# Patient Record
Sex: FEMALE | Race: WHITE | NOT HISPANIC OR LATINO | URBAN - METROPOLITAN AREA
[De-identification: names, ages, dates, MRNs, and addresses within clinical notes are randomized per-mention and may not be internally consistent; named-entity substitution may affect disease eponyms.]

---

## 2017-09-02 ENCOUNTER — EMERGENCY (EMERGENCY)
Facility: HOSPITAL | Age: 82
LOS: 0 days | Discharge: HOME | End: 2017-09-02
Attending: EMERGENCY MEDICINE | Admitting: EMERGENCY MEDICINE
Payer: MEDICARE

## 2017-09-02 VITALS
DIASTOLIC BLOOD PRESSURE: 87 MMHG | TEMPERATURE: 98 F | HEART RATE: 80 BPM | OXYGEN SATURATION: 100 % | SYSTOLIC BLOOD PRESSURE: 133 MMHG | RESPIRATION RATE: 16 BRPM

## 2017-09-02 VITALS — WEIGHT: 164.91 LBS | HEIGHT: 64 IN
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
## 2017-09-02 DIAGNOSIS — S42.293A OTHER DISPLACED FRACTURE OF UPPER END OF UNSPECIFIED HUMERUS, INITIAL ENCOUNTER FOR CLOSED FRACTURE: ICD-10-CM
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
## 2017-09-02 DIAGNOSIS — S49.91XA UNSPECIFIED INJURY OF RIGHT SHOULDER AND UPPER ARM, INITIAL ENCOUNTER: ICD-10-CM

## 2017-09-02 PROCEDURE — 99283 EMERGENCY DEPT VISIT LOW MDM: CPT

## 2017-09-02 PROCEDURE — 73030 X-RAY EXAM OF SHOULDER: CPT | Mod: 26,RT

## 2017-09-02 PROCEDURE — 73060 X-RAY EXAM OF HUMERUS: CPT | Mod: 26,RT

## 2017-09-02 RX ORDER — IBUPROFEN 200 MG
1 TABLET ORAL
Qty: 20 | Refills: 0
Start: 2017-09-02 | End: 2017-09-07

## 2017-09-02 NOTE — ED STATDOCS - OBJECTIVE STATEMENT
81 y/o female presents to the ED c/o fall. Pt was walking in a normal pace into the store where she fell, and has right shoulder/arm pain,  happened half hour ago. Pt landed on her arm and shoulder.  PSX right shoulder surgery "years ago". Denies numbness/tingling, no pain the neck.

## 2017-09-02 NOTE — ED STATDOCS - PROGRESS NOTE DETAILS
SUZIE Pierre:   Patient has been seen, evaluated and orders have been written by the attending in intake. Patient is stable.  I will follow up the results of orders written and I will continue to evaluate/observe the patient.  Paula Pierre PA-C Dr. Douglas reviewed films and patient may follow up in office Tuesday.  Pt. defers pain management.  Paula Pierre PA-C sling applied.  Paula Pierre PA-C

## 2017-09-02 NOTE — ED STATDOCS - MEDICAL DECISION MAKING DETAILS
Humeral head fx.  Neurovascularly intact.  Sling applied, and evaluated by Dr. zhao here.  Okay for d/c home, pain meds, and f/u with orthopedics.  Return precautions given.

## 2018-02-21 ENCOUNTER — EMERGENCY (EMERGENCY)
Facility: HOSPITAL | Age: 83
LOS: 0 days | Discharge: HOME | End: 2018-02-21
Attending: EMERGENCY MEDICINE | Admitting: EMERGENCY MEDICINE
Payer: COMMERCIAL

## 2018-02-21 VITALS
SYSTOLIC BLOOD PRESSURE: 120 MMHG | HEART RATE: 70 BPM | OXYGEN SATURATION: 100 % | TEMPERATURE: 98 F | DIASTOLIC BLOOD PRESSURE: 87 MMHG | RESPIRATION RATE: 20 BRPM

## 2018-02-21 VITALS — WEIGHT: 141.1 LBS

## 2018-02-21 DIAGNOSIS — Z04.1 ENCOUNTER FOR EXAMINATION AND OBSERVATION FOLLOWING TRANSPORT ACCIDENT: ICD-10-CM

## 2018-02-21 PROCEDURE — 99283 EMERGENCY DEPT VISIT LOW MDM: CPT
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
## 2018-05-19 ENCOUNTER — APPOINTMENT (OUTPATIENT)
Dept: CT IMAGING | Facility: CLINIC | Age: 83
End: 2018-05-19

## 2018-05-19 ENCOUNTER — OUTPATIENT (OUTPATIENT)
Dept: OUTPATIENT SERVICES | Facility: HOSPITAL | Age: 83
LOS: 1 days | End: 2018-05-19
Payer: MEDICARE

## 2018-05-19 DIAGNOSIS — Z00.8 ENCOUNTER FOR OTHER GENERAL EXAMINATION: ICD-10-CM

## 2018-05-19 PROCEDURE — 74263 CT COLONOGRAPHY SCREENING: CPT

## 2018-05-19 PROCEDURE — 74263 CT COLONOGRAPHY SCREENING: CPT | Mod: 26,GY

## 2019-11-12 ENCOUNTER — EMERGENCY (EMERGENCY)
Facility: HOSPITAL | Age: 84
LOS: 0 days | Discharge: HOME | End: 2019-11-12
Attending: STUDENT IN AN ORGANIZED HEALTH CARE EDUCATION/TRAINING PROGRAM
Payer: MEDICARE

## 2019-11-12 VITALS
RESPIRATION RATE: 16 BRPM | TEMPERATURE: 98 F | DIASTOLIC BLOOD PRESSURE: 87 MMHG | HEART RATE: 98 BPM | SYSTOLIC BLOOD PRESSURE: 104 MMHG | WEIGHT: 156.09 LBS | OXYGEN SATURATION: 100 % | HEIGHT: 64 IN

## 2019-11-12 VITALS
OXYGEN SATURATION: 97 % | HEART RATE: 94 BPM | TEMPERATURE: 97 F | DIASTOLIC BLOOD PRESSURE: 77 MMHG | RESPIRATION RATE: 16 BRPM | SYSTOLIC BLOOD PRESSURE: 108 MMHG

## 2019-11-12 DIAGNOSIS — S09.90XA UNSPECIFIED INJURY OF HEAD, INITIAL ENCOUNTER: ICD-10-CM
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
## 2019-11-12 DIAGNOSIS — M50.30 OTHER CERVICAL DISC DEGENERATION, UNSPECIFIED CERVICAL REGION: ICD-10-CM

## 2019-11-12 PROCEDURE — 72125 CT NECK SPINE W/O DYE: CPT | Mod: 26

## 2019-11-12 PROCEDURE — 76376 3D RENDER W/INTRP POSTPROCES: CPT

## 2019-11-12 PROCEDURE — 70486 CT MAXILLOFACIAL W/O DYE: CPT | Mod: 26

## 2019-11-12 PROCEDURE — 72125 CT NECK SPINE W/O DYE: CPT

## 2019-11-12 PROCEDURE — 70450 CT HEAD/BRAIN W/O DYE: CPT

## 2019-11-12 PROCEDURE — 70486 CT MAXILLOFACIAL W/O DYE: CPT

## 2019-11-12 PROCEDURE — 99284 EMERGENCY DEPT VISIT MOD MDM: CPT | Mod: 25

## 2019-11-12 PROCEDURE — 76376 3D RENDER W/INTRP POSTPROCES: CPT | Mod: 26

## 2019-11-12 PROCEDURE — 99284 EMERGENCY DEPT VISIT MOD MDM: CPT

## 2019-11-12 PROCEDURE — 70450 CT HEAD/BRAIN W/O DYE: CPT | Mod: 26

## 2019-11-12 NOTE — ED PROVIDER NOTE - OBJECTIVE STATEMENT
85 y/o female with no significant PMHx presents to the ED s/p slip and fall on leaves. Pt notes she was walking back from the grocery store when she slipped and fell. No LOC. +multiple abrasions. Not on anticoagulants. Tdap UTD. Pt was able to ambulate after. No other complaints at this time.

## 2019-11-12 NOTE — ED PROVIDER NOTE - PROGRESS NOTE DETAILS
Katy DO: Patient ambulatory in ED with steady gait; given copy of all ct scans; notified of need for outpatient dental f/u- patient with no dental complaints at this time; strict return precautions given.

## 2019-11-12 NOTE — ED PROVIDER NOTE - PATIENT PORTAL LINK FT
You can access the FollowMyHealth Patient Portal offered by [facility] by registering at the following website: [email or web address]. By joining [employer]’s FollowMyHealth portal, you will also be able to view your health information using other applications (apps) compatible with our system.

## 2019-11-12 NOTE — ED ADULT NURSE NOTE - NSIMPLEMENTINTERV_GEN_ALL_ED
Implemented All Universal Safety Interventions:  Wana to call system. Call bell, personal items and telephone within reach. Instruct patient to call for assistance. Room bathroom lighting operational. Non-slip footwear when patient is off stretcher. Physically safe environment: no spills, clutter or unnecessary equipment. Stretcher in lowest position, wheels locked, appropriate side rails in place.

## 2019-11-12 NOTE — ED ADULT TRIAGE NOTE - CHIEF COMPLAINT QUOTE
Pt BIBEMS for eval of slip/fall on leaves with abrasions to forehead, nose, lower lip, sm lace to chin, bilateral knee abrasions. Pt is AAO x4 denies AC and denies LOC.

## 2019-11-12 NOTE — ED PROVIDER NOTE - SKIN, MLM
Skin normal color for race, warm, dry. No evidence of rash. Abrasion to nasal bridge above lip. Abrasion to right knee.

## 2019-11-19 ENCOUNTER — APPOINTMENT (OUTPATIENT)
Dept: UROGYNECOLOGY | Facility: CLINIC | Age: 84
End: 2019-11-19

## 2019-12-25 ENCOUNTER — TRANSCRIPTION ENCOUNTER (OUTPATIENT)
Age: 84
End: 2019-12-25

## 2020-04-06 NOTE — ED ADULT NURSE NOTE - CAS EDP DISCH TYPE
Anemia    CAD (coronary artery disease)    DM (diabetes mellitus)    Hyperlipidemia    Hypertension
Home

## 2020-05-28 ENCOUNTER — EMERGENCY (EMERGENCY)
Facility: HOSPITAL | Age: 85
LOS: 0 days | Discharge: HOME | End: 2020-05-28
Attending: EMERGENCY MEDICINE
Payer: MEDICARE

## 2020-05-28 VITALS
HEART RATE: 66 BPM | RESPIRATION RATE: 16 BRPM | SYSTOLIC BLOOD PRESSURE: 134 MMHG | OXYGEN SATURATION: 100 % | DIASTOLIC BLOOD PRESSURE: 76 MMHG | TEMPERATURE: 98 F

## 2020-05-28 DIAGNOSIS — R05 COUGH: ICD-10-CM

## 2020-05-28 PROCEDURE — 99283 EMERGENCY DEPT VISIT LOW MDM: CPT

## 2020-05-28 PROCEDURE — 99282 EMERGENCY DEPT VISIT SF MDM: CPT

## 2020-05-28 PROCEDURE — U0003: CPT

## 2020-05-28 NOTE — ED STATDOCS - OBJECTIVE STATEMENT
84 yo pt presents to ED for cough.  Pt is requesting covid19 swab.  Pt with cough, no fever, no sob, no n/v/d.  No travel, no sick contact. No other complaints

## 2020-05-28 NOTE — ED ADULT NURSE NOTE - OBJECTIVE STATEMENT
Patient evaluated, treated, and discharged by MD. Refer to MD note for assessment. Discharge instructions given verbally and understood by patient. Self-quarantine and COVID-19 information provided to patient. Unable to sign secondary to COVID-19 PUI. Pt. gives verbal consent for results to be given via phone.

## 2020-05-28 NOTE — ED STATDOCS - PATIENT PORTAL LINK FT
You can access the FollowMyHealth Patient Portal offered by [facility] by registering at the following website: [email or web address]. By joining [employer]’s FollowMyHealth portal, you will also be able to view your health information using other applications (apps) compatible with our system.

## 2020-05-29 LAB — SARS-COV-2 RNA SPEC QL NAA+PROBE: SIGNIFICANT CHANGE UP

## 2020-07-25 ENCOUNTER — EMERGENCY (EMERGENCY)
Facility: HOSPITAL | Age: 85
LOS: 0 days | Discharge: HOME | End: 2020-07-25
Attending: EMERGENCY MEDICINE
Payer: MEDICARE

## 2020-07-25 VITALS
WEIGHT: 160.06 LBS | TEMPERATURE: 99 F | SYSTOLIC BLOOD PRESSURE: 145 MMHG | DIASTOLIC BLOOD PRESSURE: 77 MMHG | OXYGEN SATURATION: 97 % | RESPIRATION RATE: 16 BRPM | HEART RATE: 65 BPM | HEIGHT: 65 IN

## 2020-07-25 DIAGNOSIS — S00.83XA CONTUSION OF OTHER PART OF HEAD, INITIAL ENCOUNTER: ICD-10-CM
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
## 2020-07-25 DIAGNOSIS — Z23 ENCOUNTER FOR IMMUNIZATION: ICD-10-CM

## 2020-07-25 DIAGNOSIS — S09.90XA UNSPECIFIED INJURY OF HEAD, INITIAL ENCOUNTER: ICD-10-CM

## 2020-07-25 DIAGNOSIS — S42.024A NONDISPLACED FRACTURE OF SHAFT OF RIGHT CLAVICLE, INITIAL ENCOUNTER FOR CLOSED FRACTURE: ICD-10-CM

## 2020-07-25 DIAGNOSIS — M25.511 PAIN IN RIGHT SHOULDER: ICD-10-CM

## 2020-07-25 DIAGNOSIS — S80.212A ABRASION, LEFT KNEE, INITIAL ENCOUNTER: ICD-10-CM

## 2020-07-25 DIAGNOSIS — S80.211A ABRASION, RIGHT KNEE, INITIAL ENCOUNTER: ICD-10-CM
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
## 2020-07-25 PROCEDURE — 72170 X-RAY EXAM OF PELVIS: CPT | Mod: 26

## 2020-07-25 PROCEDURE — 73030 X-RAY EXAM OF SHOULDER: CPT | Mod: RT

## 2020-07-25 PROCEDURE — 72170 X-RAY EXAM OF PELVIS: CPT

## 2020-07-25 PROCEDURE — 73030 X-RAY EXAM OF SHOULDER: CPT | Mod: 26,RT

## 2020-07-25 PROCEDURE — 99284 EMERGENCY DEPT VISIT MOD MDM: CPT

## 2020-07-25 PROCEDURE — 73562 X-RAY EXAM OF KNEE 3: CPT | Mod: 50

## 2020-07-25 PROCEDURE — 70450 CT HEAD/BRAIN W/O DYE: CPT

## 2020-07-25 PROCEDURE — 73562 X-RAY EXAM OF KNEE 3: CPT | Mod: 26,50

## 2020-07-25 PROCEDURE — 99284 EMERGENCY DEPT VISIT MOD MDM: CPT | Mod: 25

## 2020-07-25 PROCEDURE — 70450 CT HEAD/BRAIN W/O DYE: CPT | Mod: 26

## 2020-07-25 PROCEDURE — 90715 TDAP VACCINE 7 YRS/> IM: CPT

## 2020-07-25 PROCEDURE — 90471 IMMUNIZATION ADMIN: CPT

## 2020-07-25 RX ORDER — BACITRACIN ZINC 500 UNIT/G
1 OINTMENT IN PACKET (EA) TOPICAL ONCE
Refills: 0 | Status: COMPLETED | OUTPATIENT
Start: 2020-07-25 | End: 2020-07-25

## 2020-07-25 RX ORDER — TETANUS TOXOID, REDUCED DIPHTHERIA TOXOID AND ACELLULAR PERTUSSIS VACCINE, ADSORBED 5; 2.5; 8; 8; 2.5 [IU]/.5ML; [IU]/.5ML; UG/.5ML; UG/.5ML; UG/.5ML
0.5 SUSPENSION INTRAMUSCULAR ONCE
Refills: 0 | Status: COMPLETED | OUTPATIENT
Start: 2020-07-25 | End: 2020-07-25

## 2020-07-25 RX ORDER — ACETAMINOPHEN 500 MG
650 TABLET ORAL ONCE
Refills: 0 | Status: COMPLETED | OUTPATIENT
Start: 2020-07-25 | End: 2020-07-25

## 2020-07-25 RX ADMIN — TETANUS TOXOID, REDUCED DIPHTHERIA TOXOID AND ACELLULAR PERTUSSIS VACCINE, ADSORBED 0.5 MILLILITER(S): 5; 2.5; 8; 8; 2.5 SUSPENSION INTRAMUSCULAR at 13:53

## 2020-07-25 RX ADMIN — Medication 1 APPLICATION(S): at 13:54

## 2020-07-25 RX ADMIN — Medication 650 MILLIGRAM(S): at 13:52

## 2020-07-25 NOTE — ED PROVIDER NOTE - CARE PROVIDER_API CALL
Jama Dale  ORTHOPAEDIC SURGERY  [address] [place] [address]  Phone: [phone]  Fax: [phone]  Follow Up Time:

## 2020-07-25 NOTE — ED ADULT NURSE NOTE - PHONE #
Resting in bed with eyes closed. No signs of distress, labored breathing or pain noted. Assessment complete. VS stable. All needs met at this time. Bed in lowest position with alarm and Avasys in place. Call light within reach. [phone]

## 2020-07-25 NOTE — ED PROVIDER NOTE - ENMT, MLM
+Swelling and ecchymosis to right eyebrow. Airway patent, Nasal mucosa clear. Mouth with normal mucosa.

## 2020-07-25 NOTE — ED PROVIDER NOTE - PROGRESS NOTE DETAILS
spoke to pt . will give pt a sling, recommend follow up with an orthopedist. pt wants tylenol for pain JACIEL Yusuf DO

## 2020-07-25 NOTE — ED PROVIDER NOTE - MUSCULOSKELETAL, MLM
Spine appears normal. Right shoulder is non-tender but has decreased ROM secondary to pain without deformities, abrasions, swelling nor ecchymosis. FROM of the legs.

## 2020-07-25 NOTE — ED ADULT NURSE NOTE - OBJECTIVE STATEMENT
c/o trip and fall, denies LOC, landed on bilateral knees, and on arms out stretched, and hit right side of head on pavement while walking to car, denies taking anticoagulants, c/o decrease movement to right arm due to shoulder pain, right temporal hematoma and abrasion, bilateral knee abrasions HX: high cholesterol

## 2020-07-25 NOTE — ED PROVIDER NOTE - NSFOLLOWUPINSTRUCTIONS_ED_ALL_ED_FT
ice to right shoulder  you have a clavical fracture close to your right shoulder  keep sling on to decrease the pain and immobilize  take sling off at night as it is a choking shiloh  you got a tetanus shot today  tylenol as needed for pain  bacitrain to knees twice a day and keep dressing clean and dry, you may shower or bathe

## 2020-07-25 NOTE — ED PROVIDER NOTE - CARE PLAN
Principal Discharge DX:	Facial contusion, initial encounter  Secondary Diagnosis:	Clavicle fracture, shaft  Secondary Diagnosis:	Knee abrasion

## 2020-07-25 NOTE — ED PROVIDER NOTE - OBJECTIVE STATEMENT
85 year old female with no PMHx presents to the ED BIB EMS s/p slip and fall PTA. Pt reports slip and fall while stepping onto a sidewalk, states she struck the right side of her head and right shoulder on the sidewalk. Denies LOC. Pt says she takes blood thinners. Presents with bruising to right eyebrow and abrasions to B/L knees complaining of pain to right shoulder. Pt is unsure if her tetanus is UTD. 85 year old female with no PMHx presents to the ED BIB EMS s/p slip and fall over sidewalk PTA. Pt struck the right side of her head and right shoulder on the sidewalk. Denies LOC. Pt says she takes blood thinners. Presents with bruising to right eyebrow and abrasions to B/L knees complaining of pain to right shoulder. Pt is unsure if her tetanus is UTD.

## 2020-07-25 NOTE — ED PROVIDER NOTE - PATIENT PORTAL LINK FT
You can access the FollowMyHealth Patient Portal offered by [facility] by registering at the following website: [email or web address]. By joining [employer]’s FollowMyHealth portal, you will also be able to view your health information using other applications (apps) compatible with our system.

## 2020-07-25 NOTE — ED PROVIDER NOTE - CONSTITUTIONAL, MLM
normal... Pleasantly confused, awake, alert, oriented to person, place, time/situation and in no apparent distress.
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
## 2020-09-22 ENCOUNTER — TRANSCRIPTION ENCOUNTER (OUTPATIENT)
Age: 85
End: 2020-09-22

## 2020-09-25 NOTE — ED ADULT NURSE NOTE - CAS TRG GEN SKIN CONDITION
Hospital Medicine  Progress note    Team: [facility]OS[facility] MED A Keenan Gonzales MD  Admit Date: 8/30/2020  EDY 9/28/2020  Length of Stay:  LOS: 26 days   Code status: Full Code    Principal Problem:  Septic shock due to methicillin resistant Staphylococcus aureus    HPI / Hospital Course     Interval hx:  Refused angiogram this yesterday. Vascular surgery signed off. Will need IV abx, SNF may be best option.    ROS     Respiratory: neg for cough neg for shortness of breath  Cardiovascular: neg for chest pain neg for palpitations  Gastrointestinal: neg for nausea neg for vomiting, neg for abdominal pain neg for diarrhea neg for constipation   Behavioral/Psych: neg for depression neg for anxiety    PEx  Temp:  [98.1 °F (36.7 °C)-99.4 °F (37.4 °C)]   Pulse:  []   Resp:  [16-22]   BP: (107-130)/(72-86)   SpO2:  [91 %-99 %]     Intake/Output Summary (Last 24 hours) at 9/25/2020 0851  Last data filed at 9/25/2020 0300  Gross per 24 hour   Intake 924 ml   Output 335 ml   Net 589 ml       General Appearance: no acute distress   Heart: regular rate and rhythm  Respiratory: Normal respiratory effort, no crackles   Abdomen: Soft, non-tender; bowel sounds active  Skin: intact.Skin intact  Neurologic:  No focal numbness or weakness  Mental status: Alert, oriented x 4, affect appropriate     Recent Labs   Lab 09/23/20  0300 09/24/20  0515 09/25/20  0459   WBC 14.68* 13.90* 14.18*   HGB 8.2* 8.0* 8.0*   HCT 27.5* 27.3* 26.8*    379* 322     Recent Labs   Lab 09/23/20  0300 09/24/20  0515 09/25/20  0459    136 136   K 4.3 3.7 3.6    101 99   CO2 21* 27 26   BUN 31* 27* 22   CREATININE 1.9* 1.7* 1.7*   * 121* 89   CALCIUM 8.2* 8.2* 8.1*   MG 1.4* 1.9 1.6   PHOS 2.5* 2.3* 2.7     Recent Labs   Lab 09/19/20  0605  09/21/20  0755  09/23/20  0300 09/24/20  0515 09/25/20  0459   ALKPHOS 140*  --  111  --   --  94  --    ALT 49*  --  43  --   --  40  --    AST 55*  --  52*  --   --  50*  --    ALBUMIN 1.7*   < >  1.7*  1.7*   < > 1.6* 1.6*  1.6* 1.6*   PROT 6.9  --  6.8  --   --  6.8  --    BILITOT 0.5  --  0.5  --   --  0.5  --     < > = values in this interval not displayed.        Recent Labs   Lab 09/24/20  2126 09/24/20  2140 09/24/20  2214 09/24/20  2244 09/25/20  0138 09/25/20  0744   POCTGLUCOSE 53* 51* 68* 93 76 95       Scheduled Meds:   artificial tears  1 drop Both Eyes TID    carvediloL  6.25 mg Oral BID    fluticasone furoate-vilanteroL  1 puff Inhalation Daily    fluticasone propionate  1 spray Each Nostril Daily    heparin (porcine)  5,000 Units Subcutaneous Q8H    levoFLOXacin  750 mg Oral Every other day    levothyroxine  75 mcg Oral Before breakfast    sodium bicarbonate  1,300 mg Oral BID    vancomycin (VANCOCIN) IVPB  15 mg/kg Intravenous Q24H     Continuous Infusions:  As Needed:  acetaminophen, albuterol-ipratropium, calcium carbonate, dextrose 50%, dextrose 50%, glucagon (human recombinant), glucose, glucose, insulin aspart U-100, melatonin, ondansetron, oxyCODONE, polyethylene glycol, DIPH,PERTUS (ADACEL),TETANUS PF VAC (ADULT), Pharmacy to dose Vancomycin consult **AND** vancomycin - pharmacy to dose    ** update problem list    Active Hospital Problems    Diagnosis  POA    *Septic shock due to methicillin resistant Staphylococcus aureus [A41.02, R65.21]  Yes    Cellulitis of left lower extremity [L03.116]  Unknown    PVD (peripheral vascular disease) [I73.9]  Unknown    MRSA bacteremia [R78.81]  Yes    Acute renal insufficiency [N28.9]  Yes    Acute metabolic encephalopathy [G93.41]  No    Hospital-acquired pneumonia [J18.9, Y95]  No    Acute on chronic respiratory failure with hypoxia [J96.21]  No    Debility [R53.81]  Yes    Acute renal failure with acute tubular necrosis superimposed on stage 3 chronic kidney disease [N17.0, N18.3]  No    Septic arthritis of knee, right [M00.9]  Yes    Staphylococcal arthritis of right knee [M00.061]  Yes    Diabetic ulcer of left foot  associated with type 2 diabetes mellitus, with fat layer exposed [E11.621, L97.522]  Yes    Diabetic foot infection [E11.628, L08.9]  Yes    COPD mixed type [J44.9]  Yes     Chronic    Postablative hypothyroidism [E89.0]  Yes     Chronic    Hyperlipidemia [E78.5]  Yes     High ASCVD with CAD, elevated A1c      Type 2 diabetes mellitus with stage 3 chronic kidney disease, with long-term current use of insulin [E11.22, N18.3, Z79.4]  Not Applicable     GFR> 60, uncontrolled DM      Chronic systolic congestive heart failure [I50.22]  Yes     Chronic     EF 35% in 2015 echo, improved in 5/2018. Patient with mixed ischemic and non-ischemic cardiomyopathy        Resolved Hospital Problems    Diagnosis Date Resolved POA    Endocarditis [I38] 09/12/2020 Yes    Sepsis due to methicillin resistant Staphylococcus aureus [A41.02] 09/13/2020 Yes    Type 2 diabetes mellitus with ketoacidosis without coma, with long-term current use of insulin [E11.10, Z79.4] 09/18/2020 Not Applicable     Novolin 70/30 40U in AM, 30U in PM. Elevated A1c         Assessment and Plan  / Problems managed today    Overview/Hospital Course:  Mr. Ed Patton was admitted to hospital medicine on 08/30 for management of a left-sided diabetic foot infection that was precipitated by an undetected punction wound after stepping on a tack/nail. His presentation was notable for leukocytosis with elevated inflammatory markers, however MRI of left foot only showed cellulitis of the anterior and lateral aspect of foot. Podiatry was consulted with recommendations for IV antibiotics; he was initiated on Ciprofloxacin and Vancomycin on admission. However, blood cultures from admission resulted positive for MRSA with wound cultures from left foot also growing MRSA with Proteus mirabilis. By 09/03 cultures remained positive despite Vancomycin, and patient was noted to develop right knee pain with swelling. Orthopedics was consulted and patient underwent  right knee joint aspiration positive for septic arthritis with cultures also positive for MRSA.      LUKAS on 09/04 showed known chronic systolic heart failure, but was negative for endocarditis. MRI of lumbar spine on 09/08 was negative for abscess. By 09/06, blood cultures continued to remain positive, and he underwent I&D of right arm abscess on 09/08 with cultures also positive for MRSA.      For persistent MRSA bacteremia, he was transitioned to Daptomycin and Ceftaroline, however this regimen was discontinued by 09/10 due to concern for developing rhabdomylosis. By 09/09, he was noted to develop a progressively worsening leukocytosis and NANETTE. Nephrology was consulted on 09/09 with suspicion for ATN.      Hematology was consulted on 09/12 for persistent leukocytosis as likely reactive from bacteremia. On 09/10, patient began developing intermitent hypotension prompting broadening of antibiotics to Cefepime and Vancomycin. By 09/12, renal function continued to worsen in addition to hypotension prompting transfer to ICU for vasopressor support and possible HD. Patient found to be encephalopathic, central line placed and started on CRRT overnight on 9/14 with improvement in mental status, but remained encephalopathic. CT head without any acute intracranial process. Vasopressin off AM of 9/15 but remains on levophed, CT abdomen with concern for potential cholecystitis and possible atelectasis with superimposed pneumonia.  Due to the acuity of his illness is he is not a surgical candidate it and ultimately is now status post cholecystostomy tube placement for management of suspected cholecystitis     Additional complications arising during hospital course include the development metabolic encephalopathy secondary to uremia versus shock versus DKA or overall cerebral hypoperfusion from shock, reported to have agitation prior to step-down from ICU.  Also developed DKA requiring standard therapy with IV insulin infusion,  however he was kept on IV insulin due to rising sugars any time it was weaned working on basal bolus regimen for him at this time with the assistance of a endocrinology           Septic shock_Septic arthritis of Right Knee_Sespsi due to MRSA  This is a 65 year old  male with PMH significant for HFrEF and COPD with chronic respiratory failure (on 2L home oxygen), T2DM with CKD III, and iron deficiency anemia who originally presented to [facility] on 08/30 with cellulitis of left foot with concern for diabetic foot infection with subsequent development of MRSA bacteremia attributed to septic arthritis of right knee and elbow. He is status post drainage and coverage for MRSA since that time. His work-up for other etiologies of MRSA bacteremia have included negative LUKAS and MRI of lumbar spine looking for endocarditis and spinal abscess, respectively. Stool studies for C.diff on 09/11 were negative. His hospital course has been associated with worsening hypotension and leukocytosis since 09/10 prompting ICU admission on 09/12 for initiation of vasopressor support. Infectious work-up thus far on ICU admission concern for likely right lower lobe HAP.      CT abdomen pelvis on 9/15 with gallbladder dilation, sludge, and trace pericholic fluid collection. Exam not consistent with cholecystitis but given persistent shock potential source. Also with potential right lower lobe atelectasis with overlying infection  S/p ICU stepdown  -CK levels were rising so spoke with infectious disease and will need to switch patient back to Vancomycin. - Consult order placed.      Type 2 DM with DKA  -endocrinology following pt kept on transitional drip but has since been weaned to a basal bolus regimen.         Postablative hypothyroidism  Plan: Continue home SYNTHROID     Acute kidney injury  -likely ATN from Shock s/p requiring RRT in ICU  -trialysis line removed   -pt is non-oliguric  -nephrology recommends sodium bicarb  tabs - order placed.   -Nephrology recommends ambulatory f/u on discharge     Chronic systolic congestive heart failure  -Most recent ECHO in 09/2020 with EF of 25%.   -Unclear is ischemic vs non-ischemic.   -Home regimen: Carvedilol, Lasix 80 mg, and Lisinopril.           >due to NANETTE lasix and Lisinopril on hold, but he is recovering renal function and prior to discharge to any facility will likely need some amount of diuretic re-initiated and potentially a much lower doseage of Ace-inhibitor.   -Associated with chronic hypoxemic respiratory failure requiring 2L nasal cannula, but noted to develop worsening oxygen requirements on ICU admission that were likely multifactorial from suspected hospital acquired pneumonia versus declining urine output with pre-disposition to volume overload. .      COPD mixed type  -Based on PFT's from 05/2015 showing mild (small airways) obstruction, airflow not improved after bronchodilator, and moderate restriction.     Acute on chronic respiratory failure with hypoxia  -History of mixed COPD (based on PFT's from 05/2015 showing mild (small airways) obstruction, airflow not improved after bronchodilator, and moderate restriction) and HFrEF with chronic respiratory failure on 2L home oxygen.   -ICU admission notable for progressive increase in supplemental oxygen requirements.   -Work-up for underlying etiology of acute on chronic respiratory failure include CXR showing concern for possible progression of CHF vs HAP (not entirely convinced that CXR showed consolidation)  - Breathing back to baseline now to 2L NC     Code Status: Full Code     High Risk Conditions:  Patient has a condition that poses threat to life and bodily function: Septic shock, MRSA bacteremia  Patient is currently on drug therapy requiring intensive monitoring for toxicity: Daptomycin.      Discharge Planning   EDY: 9/25/2020     Code Status: Full Code   Is the patient medically ready for discharge?: No    Reason  for patient still in hospital (select all that apply): Patient trending condition  Discharge Plan A: Skilled Nursing Facility   Discharge Delays: (!) Change in Medical Condition           Diet:    GI PPx:   DVT PPx:    Lines:  Drains:  Airways:  Wounds:    Goals of Care:  Return to prior functional status   Discharge plan:    Time (minutes) spent in care of the patient (Greater than 1/2 spent in direct face-to-face contact)  35 minutes    Keenan Gonzales MD      Warm

## 2020-10-24 ENCOUNTER — EMERGENCY (EMERGENCY)
Facility: HOSPITAL | Age: 85
LOS: 0 days | Discharge: HOME | End: 2020-10-24
Payer: MEDICARE

## 2020-10-24 VITALS
SYSTOLIC BLOOD PRESSURE: 105 MMHG | DIASTOLIC BLOOD PRESSURE: 64 MMHG | TEMPERATURE: 99 F | RESPIRATION RATE: 16 BRPM | HEIGHT: 65 IN | HEART RATE: 74 BPM | OXYGEN SATURATION: 100 %

## 2020-10-24 DIAGNOSIS — Z20.828 CONTACT WITH AND (SUSPECTED) EXPOSURE TO OTHER VIRAL COMMUNICABLE DISEASES: ICD-10-CM

## 2020-10-24 LAB — SARS-COV-2 RNA SPEC QL NAA+PROBE: SIGNIFICANT CHANGE UP

## 2020-10-24 PROCEDURE — 99283 EMERGENCY DEPT VISIT LOW MDM: CPT | Mod: CS

## 2020-10-24 PROCEDURE — 99282 EMERGENCY DEPT VISIT SF MDM: CPT

## 2020-10-24 PROCEDURE — 99283 EMERGENCY DEPT VISIT LOW MDM: CPT

## 2020-10-24 PROCEDURE — U0003: CPT

## 2020-10-24 NOTE — ED STATDOCS - PATIENT PORTAL LINK FT
You can access the FollowMyHealth Patient Portal offered by [facility] by registering at the following website: [email or web address]. By joining [employer]’s FollowMyHealth portal, you will also be able to view your health information using other applications (apps) compatible with our system.

## 2021-10-24 ENCOUNTER — TRANSCRIPTION ENCOUNTER (OUTPATIENT)
Age: 86
End: 2021-10-24

## 2021-11-29 ENCOUNTER — TRANSCRIPTION ENCOUNTER (OUTPATIENT)
Age: 86
End: 2021-11-29

## 2022-04-25 NOTE — ED ADULT NURSE NOTE - EDEMA DEGREE
Patient tolerated taxotere well today. NAD noted upon discharge. Port + blood return present, flushed, hep locked and deaccessed. Discharged home, ambulated independently.    non-pitting

## 2022-06-13 NOTE — ED STATDOCS - CHIEF COMPLAINT
I reviewed the H&P, I examined the patient, and there are no changes in the patient's condition.     Also explained to her the possibility of needing to stay in hospital for better pain control and fluid if there is extensive adhesion between small bowel and hernia sac/fascia from her prior open laparotomy for diverticular disease along with potential need for placing mesh and drain.  
The patient is a 82y year old Female complaining of fall.

## 2022-08-08 ENCOUNTER — EMERGENCY (EMERGENCY)
Facility: HOSPITAL | Age: 87
LOS: 0 days | Discharge: HOME | End: 2022-08-09
Attending: STUDENT IN AN ORGANIZED HEALTH CARE EDUCATION/TRAINING PROGRAM
Payer: MEDICARE

## 2022-08-08 VITALS
OXYGEN SATURATION: 98 % | TEMPERATURE: 98 F | WEIGHT: 151.9 LBS | DIASTOLIC BLOOD PRESSURE: 89 MMHG | RESPIRATION RATE: 18 BRPM | SYSTOLIC BLOOD PRESSURE: 120 MMHG | HEIGHT: 65 IN | HEART RATE: 90 BPM

## 2022-08-08 DIAGNOSIS — S00.03XA CONTUSION OF SCALP, INITIAL ENCOUNTER: ICD-10-CM

## 2022-08-08 DIAGNOSIS — F03.90 UNSPECIFIED DEMENTIA WITHOUT BEHAVIORAL DISTURBANCE: ICD-10-CM
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
## 2022-08-08 DIAGNOSIS — M54.50 LOW BACK PAIN, UNSPECIFIED: ICD-10-CM

## 2022-08-08 DIAGNOSIS — M25.551 PAIN IN RIGHT HIP: ICD-10-CM

## 2022-08-08 DIAGNOSIS — Z20.822 CONTACT WITH AND (SUSPECTED) EXPOSURE TO COVID-19: ICD-10-CM
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
## 2022-08-08 PROCEDURE — U0005: CPT

## 2022-08-08 PROCEDURE — U0003: CPT

## 2022-08-08 PROCEDURE — 72125 CT NECK SPINE W/O DYE: CPT | Mod: MA

## 2022-08-08 PROCEDURE — 70450 CT HEAD/BRAIN W/O DYE: CPT | Mod: 26,MA

## 2022-08-08 PROCEDURE — 72170 X-RAY EXAM OF PELVIS: CPT

## 2022-08-08 PROCEDURE — 99284 EMERGENCY DEPT VISIT MOD MDM: CPT | Mod: 25

## 2022-08-08 PROCEDURE — 70450 CT HEAD/BRAIN W/O DYE: CPT | Mod: MA

## 2022-08-08 PROCEDURE — 99284 EMERGENCY DEPT VISIT MOD MDM: CPT

## 2022-08-08 PROCEDURE — 72125 CT NECK SPINE W/O DYE: CPT | Mod: 26,MA

## 2022-08-08 PROCEDURE — 72170 X-RAY EXAM OF PELVIS: CPT | Mod: 26

## 2022-08-08 RX ORDER — ACETAMINOPHEN 500 MG
650 TABLET ORAL ONCE
Refills: 0 | Status: COMPLETED | OUTPATIENT
Start: 2022-08-08 | End: 2022-08-08

## 2022-08-08 RX ORDER — LIDOCAINE 4 G/100G
1 CREAM TOPICAL ONCE
Refills: 0 | Status: COMPLETED | OUTPATIENT
Start: 2022-08-08 | End: 2022-08-08

## 2022-08-08 RX ADMIN — Medication 650 MILLIGRAM(S): at 21:27

## 2022-08-08 RX ADMIN — LIDOCAINE 1 PATCH: 4 CREAM TOPICAL at 21:27

## 2022-08-08 NOTE — ED PROVIDER NOTE - OBJECTIVE STATEMENT
88 y/o female with no pertinent PMHx states she was trying to reach something this morning and her hand slipped and she fell forward, hit the wall with her face and now feels lower back when she moves. Pt reports back pain on movement or leaning over. No LOC, no ACs. Denies nausea/vomiting. Pt states that she feels fine, but came in because her  thought she should. Pt took a couple of advil for her pain PTA, but does not normally take anything.

## 2022-08-08 NOTE — ED PROVIDER NOTE - NSFOLLOWUPINSTRUCTIONS_ED_ALL_ED_FT
please follow up with your doctor in 2-3 days.   take tylenol for pain.  may use ice or heating pads for comfort.   drink plenty of fluids.   return to ED for any concerns

## 2022-08-08 NOTE — ED ADULT TRIAGE NOTE - CHIEF COMPLAINT QUOTE
Pt brought in by ambulance from home s/p fall. Pt denies LOC. No AC. Pt complains of right hip and back pain. Pt unable to get up on own after fall secondary to pain.

## 2022-08-08 NOTE — ED ADULT NURSE NOTE - OBJECTIVE STATEMENT
87 year old female found laying on stretcher complaining hip and back pain after a slip and fall.  no blood thinners.  pt took "a couple of advils" prior to arrival. denies head strike

## 2022-08-08 NOTE — ED PROVIDER NOTE - CLINICAL SUMMARY MEDICAL DECISION MAKING FREE TEXT BOX
Elderly female, demented at baseline. Presents with mechanical fall with head strike. C/o lower back pain. On exam has tenderness to right hip. Plan is to CT head neck, x-ray hip and reassess.

## 2022-08-08 NOTE — ED ADULT NURSE NOTE - NSIMPLEMENTINTERV_GEN_ALL_ED
Implemented All Fall with Harm Risk Interventions:  [place] to call system. Call bell, personal items and telephone within reach. Instruct patient to call for assistance. Room bathroom lighting operational. Non-slip footwear when patient is off stretcher. Physically safe environment: no spills, clutter or unnecessary equipment. Stretcher in lowest position, wheels locked, appropriate side rails in place. Provide visual cue, wrist band, yellow gown, etc. Monitor gait and stability. Monitor for mental status changes and reorient to person, place, and time. Review medications for side effects contributing to fall risk. Reinforce activity limits and safety measures with patient and family. Provide visual clues: red socks.

## 2022-08-08 NOTE — ED PROVIDER NOTE - PATIENT PORTAL LINK FT
You can access the FollowMyHealth Patient Portal offered by [facility] by registering at the following website: [email or web address]. By joining [facility]’s FollowMyHealth portal, you will also be able to view your health information using other applications (apps) compatible with our system.

## 2022-08-09 VITALS
TEMPERATURE: 98 F | HEART RATE: 85 BPM | OXYGEN SATURATION: 100 % | DIASTOLIC BLOOD PRESSURE: 68 MMHG | RESPIRATION RATE: 16 BRPM | SYSTOLIC BLOOD PRESSURE: 117 MMHG

## 2022-08-17 ENCOUNTER — INPATIENT (INPATIENT)
Facility: HOSPITAL | Age: 87
LOS: 0 days | Discharge: NURSING, REHAB OR LONG-TERM CARE | DRG: 536 | End: 2022-08-18
Attending: INTERNAL MEDICINE | Admitting: INTERNAL MEDICINE
Payer: MEDICARE

## 2022-08-17 VITALS
HEIGHT: 65 IN | DIASTOLIC BLOOD PRESSURE: 78 MMHG | HEART RATE: 81 BPM | OXYGEN SATURATION: 97 % | TEMPERATURE: 99 F | RESPIRATION RATE: 18 BRPM | SYSTOLIC BLOOD PRESSURE: 113 MMHG | WEIGHT: 125 LBS

## 2022-08-17 DIAGNOSIS — S32.9XXA FRACTURE OF UNSPECIFIED PARTS OF LUMBOSACRAL SPINE AND PELVIS, INITIAL ENCOUNTER FOR CLOSED FRACTURE: ICD-10-CM

## 2022-08-17 LAB
ALBUMIN SERPL ELPH-MCNC: 3.6 G/DL — SIGNIFICANT CHANGE UP (ref 3.3–5)
ALP SERPL-CCNC: 95 U/L — SIGNIFICANT CHANGE UP (ref 40–120)
ALT FLD-CCNC: 18 U/L — SIGNIFICANT CHANGE UP (ref 10–45)
ANION GAP SERPL CALC-SCNC: 6 MMOL/L — SIGNIFICANT CHANGE UP (ref 5–17)
AST SERPL-CCNC: 24 U/L — SIGNIFICANT CHANGE UP (ref 10–40)
BASOPHILS # BLD AUTO: 0.04 K/UL — SIGNIFICANT CHANGE UP (ref 0–0.2)
BASOPHILS NFR BLD AUTO: 0.6 % — SIGNIFICANT CHANGE UP (ref 0–2)
BILIRUB SERPL-MCNC: 0.4 MG/DL — SIGNIFICANT CHANGE UP (ref 0.2–1.2)
BUN SERPL-MCNC: 17 MG/DL — SIGNIFICANT CHANGE UP (ref 7–23)
CALCIUM SERPL-MCNC: 9.4 MG/DL — SIGNIFICANT CHANGE UP (ref 8.4–10.5)
CHLORIDE SERPL-SCNC: 100 MMOL/L — SIGNIFICANT CHANGE UP (ref 96–108)
CO2 SERPL-SCNC: 31 MMOL/L — SIGNIFICANT CHANGE UP (ref 22–31)
CREAT SERPL-MCNC: 0.79 MG/DL — SIGNIFICANT CHANGE UP (ref 0.5–1.3)
EGFR: 73 ML/MIN/1.73M2 — SIGNIFICANT CHANGE UP
EOSINOPHIL # BLD AUTO: 0.21 K/UL — SIGNIFICANT CHANGE UP (ref 0–0.5)
EOSINOPHIL NFR BLD AUTO: 3.4 % — SIGNIFICANT CHANGE UP (ref 0–6)
GLUCOSE SERPL-MCNC: 99 MG/DL — SIGNIFICANT CHANGE UP (ref 70–99)
HCT VFR BLD CALC: 34.8 % — SIGNIFICANT CHANGE UP (ref 34.5–45)
HGB BLD-MCNC: 11.2 G/DL — LOW (ref 11.5–15.5)
IMM GRANULOCYTES NFR BLD AUTO: 0.2 % — SIGNIFICANT CHANGE UP (ref 0–1.5)
LYMPHOCYTES # BLD AUTO: 1.47 K/UL — SIGNIFICANT CHANGE UP (ref 1–3.3)
LYMPHOCYTES # BLD AUTO: 23.7 % — SIGNIFICANT CHANGE UP (ref 13–44)
MCHC RBC-ENTMCNC: 29.9 PG — SIGNIFICANT CHANGE UP (ref 27–34)
MCHC RBC-ENTMCNC: 32.2 GM/DL — SIGNIFICANT CHANGE UP (ref 32–36)
MCV RBC AUTO: 93 FL — SIGNIFICANT CHANGE UP (ref 80–100)
MONOCYTES # BLD AUTO: 0.42 K/UL — SIGNIFICANT CHANGE UP (ref 0–0.9)
MONOCYTES NFR BLD AUTO: 6.8 % — SIGNIFICANT CHANGE UP (ref 2–14)
NEUTROPHILS # BLD AUTO: 4.04 K/UL — SIGNIFICANT CHANGE UP (ref 1.8–7.4)
NEUTROPHILS NFR BLD AUTO: 65.3 % — SIGNIFICANT CHANGE UP (ref 43–77)
NRBC # BLD: 0 /100 WBCS — SIGNIFICANT CHANGE UP (ref 0–0)
PLATELET # BLD AUTO: 184 K/UL — SIGNIFICANT CHANGE UP (ref 150–400)
POTASSIUM SERPL-MCNC: 3.9 MMOL/L — SIGNIFICANT CHANGE UP (ref 3.5–5.3)
POTASSIUM SERPL-SCNC: 3.9 MMOL/L — SIGNIFICANT CHANGE UP (ref 3.5–5.3)
PROT SERPL-MCNC: 7.7 G/DL — SIGNIFICANT CHANGE UP (ref 6–8.3)
RBC # BLD: 3.74 M/UL — LOW (ref 3.8–5.2)
RBC # FLD: 12.9 % — SIGNIFICANT CHANGE UP (ref 10.3–14.5)
SARS-COV-2 RNA SPEC QL NAA+PROBE: SIGNIFICANT CHANGE UP
SODIUM SERPL-SCNC: 137 MMOL/L — SIGNIFICANT CHANGE UP (ref 135–145)
WBC # BLD: 6.19 K/UL — SIGNIFICANT CHANGE UP (ref 3.8–10.5)
WBC # FLD AUTO: 6.19 K/UL — SIGNIFICANT CHANGE UP (ref 3.8–10.5)

## 2022-08-17 PROCEDURE — 99285 EMERGENCY DEPT VISIT HI MDM: CPT | Mod: FS

## 2022-08-17 PROCEDURE — 99223 1ST HOSP IP/OBS HIGH 75: CPT | Mod: AI

## 2022-08-17 PROCEDURE — 72192 CT PELVIS W/O DYE: CPT | Mod: 26,MA

## 2022-08-17 PROCEDURE — 71045 X-RAY EXAM CHEST 1 VIEW: CPT | Mod: 26

## 2022-08-17 PROCEDURE — 72131 CT LUMBAR SPINE W/O DYE: CPT | Mod: 26,MA

## 2022-08-17 PROCEDURE — 93010 ELECTROCARDIOGRAM REPORT: CPT

## 2022-08-17 RX ORDER — ACETAMINOPHEN 500 MG
650 TABLET ORAL EVERY 6 HOURS
Refills: 0 | Status: DISCONTINUED | OUTPATIENT
Start: 2022-08-17 | End: 2022-08-18

## 2022-08-17 RX ORDER — ONDANSETRON 8 MG/1
4 TABLET, FILM COATED ORAL EVERY 8 HOURS
Refills: 0 | Status: DISCONTINUED | OUTPATIENT
Start: 2022-08-17 | End: 2022-08-18

## 2022-08-17 RX ORDER — LANOLIN ALCOHOL/MO/W.PET/CERES
3 CREAM (GRAM) TOPICAL AT BEDTIME
Refills: 0 | Status: DISCONTINUED | OUTPATIENT
Start: 2022-08-17 | End: 2022-08-18

## 2022-08-17 RX ORDER — TRAMADOL HYDROCHLORIDE 50 MG/1
50 TABLET ORAL EVERY 6 HOURS
Refills: 0 | Status: DISCONTINUED | OUTPATIENT
Start: 2022-08-17 | End: 2022-08-18

## 2022-08-17 NOTE — H&P ADULT - NSHPLABSRESULTS_GEN_ALL_CORE
LABS:                        11.2   6.19  )-----------( 184      ( 17 Aug 2022 17:10 )             34.8     08-17    137  |  100  |  17  ----------------------------<  99  3.9   |  31  |  0.79    Ca    9.4      17 Aug 2022 17:10    TPro  7.7  /  Alb  3.6  /  TBili  0.4  /  DBili  x   /  AST  24  /  ALT  18  /  AlkPhos  95  08-17         CAPILLARY BLOOD GLUCOSE                RADIOLOGY & ADDITIONAL TESTS:    Consultant(s) Notes Reviewed:  [x ] YES  [ ] NO  Care Discussed with Consultants/Other Providers [ x] YES  [ ] NO Dr. Pacheco  Imaging Personally Reviewed:  [ ] YES  [ ] NO

## 2022-08-17 NOTE — ED PROVIDER NOTE - PHYSICAL EXAMINATION
Gen: Well appearing in NAD  Head: NC/AT  Neck: FROM  Spine: no midline tenderness, left paraspinal tenderness  Resp:  No distress  Ext: FROM b/l le but pain in right pelvis on palpation and with flexion of right hip. no swelling, soft compartments  Neuro:  A&O appears non focal  Skin:  Warm and dry as visualized  Psych:  Normal affect and mood

## 2022-08-17 NOTE — H&P ADULT - NSHPPHYSICALEXAM_GEN_ALL_CORE
T(C): 36.6 (08-17-22 @ 17:28), Max: 37.2 (08-17-22 @ 12:10)  HR: 60 (08-17-22 @ 17:28) (60 - 81)  BP: 118/80 (08-17-22 @ 17:28) (113/78 - 118/80)  RR: 18 (08-17-22 @ 17:28) (18 - 18)  SpO2: 100% (08-17-22 @ 17:28) (97% - 100%)  Wt(kg): --Vital Signs Last 24 Hrs  T(C): 36.6 (17 Aug 2022 17:28), Max: 37.2 (17 Aug 2022 12:10)  T(F): 97.8 (17 Aug 2022 17:28), Max: 98.9 (17 Aug 2022 12:10)  HR: 60 (17 Aug 2022 17:28) (60 - 81)  BP: 118/80 (17 Aug 2022 17:28) (113/78 - 118/80)  BP(mean): --  RR: 18 (17 Aug 2022 17:28) (18 - 18)  SpO2: 100% (17 Aug 2022 17:28) (97% - 100%)    Parameters below as of 17 Aug 2022 17:28  Patient On (Oxygen Delivery Method): room air        PHYSICAL EXAM:  GENERAL: NAD, well-groomed, well-developed  HEAD:  Atraumatic, Normocephalic  EYES: EOMI, PERRLA, conjunctiva and sclera clear  ENMT: No tonsillar erythema, exudates, or enlargement; Moist mucous membranes, Good dentition, No lesions  NECK: Supple, No JVD, Normal thyroid  NERVOUS SYSTEM:  Alert & Oriented X3, Good concentration; moving all extremities, ROM limited on RLE due to pain. sensation intact  CHEST/LUNG: Clear to percussion bilaterally; No rales, rhonchi, wheezing, or rubs  HEART: Regular rate and rhythm; + murmurs  ABDOMEN: Soft, Nontender, Nondistended; Bowel sounds present  EXTREMITIES:  No clubbing, cyanosis, or edema  SKIN: No rashes or lesions

## 2022-08-17 NOTE — ED PROVIDER NOTE - CLINICAL SUMMARY MEDICAL DECISION MAKING FREE TEXT BOX
pt 87y f s/p fall last week. pt was seen in ED. ct head and cspine negative at that time. xray pelvis also neg. pt was discharged home and since has been having pain in the right hip when putting weight on it. lives at home alone with 3 flights of steps. taking tylenol intermittently with some relief  denies numbness, tingling, swelling, fever, chills, n/v, abd pain, dysuria, urinary or bowel changes  ct pelvis and lspine, reassess pt 87y f s/p fall last week. pt was seen in ED. ct head and cspine negative at that time. xray pelvis also neg. pt was discharged home and since has been having pain in the right hip when putting weight on it. lives at home alone with 3 flights of steps. taking tylenol intermittently with some relief  denies numbness, tingling, swelling, fever, chills, n/v, abd pain, dysuria, urinary or bowel changes  ct pelvis and lspine, reassess  pelvic fx on ct, unable to ambulate safely. will admit

## 2022-08-17 NOTE — H&P ADULT - HISTORY OF PRESENT ILLNESS
87y f with no significant past medical history s/p mechanical fall last tuesday. pt was seen in ED. ct head and cspine negative at that time. xray pelvis also neg. pt was discharged home and since then c/o worsening pain in the right hip when putting weight on it. Pt usually ambulates without any assistive device prior to the fall. Pt reports there is no pain at rest but pain usually worse with weight bearing and now having difficulty with ambulation. Pain usually relieves with tylenol. Pt admits to intermittent lower back pain but usually triggers by prolonged sitting.    Pt denies numbness, tingling, swelling, fever, chills, n/v, abd pain, dysuria, urinary or bowel changes.    CT head today showed no acute intracranial trauma. right frontal scalp soft tissue swelling.     CT lumbar showed multiple chronic appearing compression fractures. severe collapse of L1 with mild bony retropulsion    CT pelvis: acute minimally displaced right pubic body fx with extension to Rt inferior pubic ramus. Acute minimally displaced comminuted fx of right puboacetabular junction. moderate age indeterminate compression fx of L4, worse when compared to prior study. 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

## 2022-08-17 NOTE — ED ADULT NURSE NOTE - OBJECTIVE STATEMENT
Pt BIB EMS from home for c/o right lower leg pain s/p mechanical fall last Tuesday. Pts family states that pt has been in 10/10 pain with ambulation. As per fam Pt BIB EMS from home for c/o right lower leg pain s/p mechanical fall last Tuesday. Pts family states that pt has been in 10/10 pain with ambulation. As per family, pt was brought to the ED for evaluation after the fall and ct scan head and xray of b/l hips came back (-). Pts family states that the pt took tylenol for the pain yesterday with some relief. Pt BIB EMS from home for c/o right hip pain s/p mechanical fall last Tuesday. Pts family states that pt has been in 10/10 pain when bearing weight on the right hip. As per family, pt was brought to the ED for evaluation after the fall and ct scan head and xray of pelvis-  both came back (-). Pts family states that pt has been taking Tylenol for pain with minimal relief.

## 2022-08-17 NOTE — ED PROVIDER NOTE - OBJECTIVE STATEMENT
pt 87y f s/p fall last week. pt was seen in ED. ct head and cspine negative at that time. xray pelvis also neg. pt was discharged home and since has been having pain in the right hip when putting weight on it. lives at home alone with 3 flights of steps. taking tylenol intermittently with some relief  denies numbness, tingling, swelling, fever, chills, n/v, abd pain, dysuria, urinary or bowel changes

## 2022-08-17 NOTE — ED PROVIDER NOTE - ATTENDING APP SHARED VISIT CONTRIBUTION OF CARE
pt 87y f s/p fall last week. pt was seen in ED. ct head and cspine negative at that time. xray pelvis also neg. pt was discharged home and since has been having pain in the right hip when putting weight on it. lives at home alone with 3 flights of steps. taking tylenol intermittently with some relief  denies numbness, tingling, swelling, fever, chills, n/v, abd pain, dysuria, urinary or bowel changes  ct pelvis and lspine, reassess  pelvic fx on ct, unable to ambulate safely. will admit  Dr. Smith:  I have reviewed and discussed with the PA/ resident the case specifics, including the history, physical assessment, evaluation, conclusion, laboratory results, and medical plan. I agree with the contents, and conclusions. I have personally examined, and interviewed the patient.

## 2022-08-17 NOTE — H&P ADULT - ASSESSMENT
87y f with no significant past medical history s/p mechanical fall last tuesday now presents to ED c/o worsening right hip pain and ambulatory difficulty.     #Acute right pelvic fx  #Multiple lumbar compression fx. 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   - CT pelvis showed 1.7cm aneurysmal dilation of bilateral common iliac arteries.   - vascular consult     dvt ppx : lovenox subq         87y f with no significant past medical history s/p mechanical fall last tuesday now presents to ED c/o worsening right hip pain and ambulatory difficulty.     #Acute right pelvic fx  #Multiple lumbar compression fx. 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   - CT pelvis showed 1.7cm aneurysmal dilation of bilateral common iliac arteries.   - follow up vascular surgery as outpt    dvt ppx : lovenox subq         87y f with no significant past medical history s/p mechanical fall last tuesday now presents to ED c/o worsening right hip pain and ambulatory difficulty.     #Acute right pelvic fx  #Multiple lumbar compression fx. 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   - CT pelvis showed 1.7cm aneurysmal dilation of bilateral common iliac arteries.   - follow up vascular surgery as outpt    dvt ppx : lovenox subq    updated pt's daughter by bedside. 8/17

## 2022-08-17 NOTE — ED PROVIDER NOTE - NS ED ATTENDING STATEMENT MOD
This was a shared visit with the NATALYA. I reviewed and verified the documentation and independently performed the documented:

## 2022-08-18 ENCOUNTER — INPATIENT (INPATIENT)
Facility: HOSPITAL | Age: 87
LOS: 8 days | Discharge: HOME HEALTH | DRG: 949 | End: 2022-08-27
Attending: PHYSICAL MEDICINE & REHABILITATION | Admitting: PHYSICAL MEDICINE & REHABILITATION
Payer: MEDICARE

## 2022-08-18 VITALS
TEMPERATURE: 98 F | DIASTOLIC BLOOD PRESSURE: 74 MMHG | RESPIRATION RATE: 15 BRPM | OXYGEN SATURATION: 100 % | SYSTOLIC BLOOD PRESSURE: 105 MMHG | HEART RATE: 91 BPM

## 2022-08-18 VITALS
HEIGHT: 63 IN | WEIGHT: 132.94 LBS | SYSTOLIC BLOOD PRESSURE: 141 MMHG | DIASTOLIC BLOOD PRESSURE: 90 MMHG | HEART RATE: 89 BPM | TEMPERATURE: 98 F | OXYGEN SATURATION: 97 % | RESPIRATION RATE: 15 BRPM

## 2022-08-18 DIAGNOSIS — S32.009A UNSPECIFIED FRACTURE OF UNSPECIFIED LUMBAR VERTEBRA, INITIAL ENCOUNTER FOR CLOSED FRACTURE: ICD-10-CM

## 2022-08-18 LAB
ANION GAP SERPL CALC-SCNC: 11 MMOL/L — SIGNIFICANT CHANGE UP (ref 5–17)
BASOPHILS # BLD AUTO: 0.05 K/UL — SIGNIFICANT CHANGE UP (ref 0–0.2)
BASOPHILS NFR BLD AUTO: 0.8 % — SIGNIFICANT CHANGE UP (ref 0–2)
BUN SERPL-MCNC: 18 MG/DL — SIGNIFICANT CHANGE UP (ref 7–23)
CALCIUM SERPL-MCNC: 9.4 MG/DL — SIGNIFICANT CHANGE UP (ref 8.4–10.5)
CHLORIDE SERPL-SCNC: 99 MMOL/L — SIGNIFICANT CHANGE UP (ref 96–108)
CO2 SERPL-SCNC: 28 MMOL/L — SIGNIFICANT CHANGE UP (ref 22–31)
CREAT SERPL-MCNC: 0.84 MG/DL — SIGNIFICANT CHANGE UP (ref 0.5–1.3)
EGFR: 68 ML/MIN/1.73M2 — SIGNIFICANT CHANGE UP
EOSINOPHIL # BLD AUTO: 0.21 K/UL — SIGNIFICANT CHANGE UP (ref 0–0.5)
EOSINOPHIL NFR BLD AUTO: 3.3 % — SIGNIFICANT CHANGE UP (ref 0–6)
GLUCOSE SERPL-MCNC: 102 MG/DL — HIGH (ref 70–99)
HCT VFR BLD CALC: 33.5 % — LOW (ref 34.5–45)
HGB BLD-MCNC: 11.1 G/DL — LOW (ref 11.5–15.5)
IMM GRANULOCYTES NFR BLD AUTO: 0.3 % — SIGNIFICANT CHANGE UP (ref 0–1.5)
LYMPHOCYTES # BLD AUTO: 1.29 K/UL — SIGNIFICANT CHANGE UP (ref 1–3.3)
LYMPHOCYTES # BLD AUTO: 20.4 % — SIGNIFICANT CHANGE UP (ref 13–44)
MCHC RBC-ENTMCNC: 30.5 PG — SIGNIFICANT CHANGE UP (ref 27–34)
MCHC RBC-ENTMCNC: 33.1 GM/DL — SIGNIFICANT CHANGE UP (ref 32–36)
MCV RBC AUTO: 92 FL — SIGNIFICANT CHANGE UP (ref 80–100)
MONOCYTES # BLD AUTO: 0.45 K/UL — SIGNIFICANT CHANGE UP (ref 0–0.9)
MONOCYTES NFR BLD AUTO: 7.1 % — SIGNIFICANT CHANGE UP (ref 2–14)
NEUTROPHILS # BLD AUTO: 4.29 K/UL — SIGNIFICANT CHANGE UP (ref 1.8–7.4)
NEUTROPHILS NFR BLD AUTO: 68.1 % — SIGNIFICANT CHANGE UP (ref 43–77)
NRBC # BLD: 0 /100 WBCS — SIGNIFICANT CHANGE UP (ref 0–0)
PLATELET # BLD AUTO: 192 K/UL — SIGNIFICANT CHANGE UP (ref 150–400)
POTASSIUM SERPL-MCNC: 3.5 MMOL/L — SIGNIFICANT CHANGE UP (ref 3.5–5.3)
POTASSIUM SERPL-SCNC: 3.5 MMOL/L — SIGNIFICANT CHANGE UP (ref 3.5–5.3)
RBC # BLD: 3.64 M/UL — LOW (ref 3.8–5.2)
RBC # FLD: 12.9 % — SIGNIFICANT CHANGE UP (ref 10.3–14.5)
SODIUM SERPL-SCNC: 138 MMOL/L — SIGNIFICANT CHANGE UP (ref 135–145)
WBC # BLD: 6.31 K/UL — SIGNIFICANT CHANGE UP (ref 3.8–10.5)
WBC # FLD AUTO: 6.31 K/UL — SIGNIFICANT CHANGE UP (ref 3.8–10.5)

## 2022-08-18 PROCEDURE — 99232 SBSQ HOSP IP/OBS MODERATE 35: CPT

## 2022-08-18 RX ORDER — ENOXAPARIN SODIUM 100 MG/ML
40 INJECTION SUBCUTANEOUS EVERY 24 HOURS
Refills: 0 | Status: DISCONTINUED | OUTPATIENT
Start: 2022-08-18 | End: 2022-08-18

## 2022-08-18 RX ORDER — TRAMADOL HYDROCHLORIDE 50 MG/1
50 TABLET ORAL EVERY 6 HOURS
Refills: 0 | Status: DISCONTINUED | OUTPATIENT
Start: 2022-08-18 | End: 2022-08-18

## 2022-08-18 RX ORDER — ACETAMINOPHEN 500 MG
650 TABLET ORAL EVERY 6 HOURS
Refills: 0 | Status: DISCONTINUED | OUTPATIENT
Start: 2022-08-18 | End: 2022-08-27

## 2022-08-18 RX ORDER — ENOXAPARIN SODIUM 100 MG/ML
40 INJECTION SUBCUTANEOUS EVERY 24 HOURS
Refills: 0 | Status: DISCONTINUED | OUTPATIENT
Start: 2022-08-19 | End: 2022-08-27

## 2022-08-18 RX ORDER — SENNA PLUS 8.6 MG/1
2 TABLET ORAL AT BEDTIME
Refills: 0 | Status: DISCONTINUED | OUTPATIENT
Start: 2022-08-18 | End: 2022-08-27

## 2022-08-18 RX ORDER — LANOLIN ALCOHOL/MO/W.PET/CERES
3 CREAM (GRAM) TOPICAL AT BEDTIME
Refills: 0 | Status: DISCONTINUED | OUTPATIENT
Start: 2022-08-18 | End: 2022-08-27

## 2022-08-18 RX ADMIN — Medication 3 MILLIGRAM(S): at 21:16

## 2022-08-18 RX ADMIN — TRAMADOL HYDROCHLORIDE 50 MILLIGRAM(S): 50 TABLET ORAL at 19:53

## 2022-08-18 RX ADMIN — TRAMADOL HYDROCHLORIDE 50 MILLIGRAM(S): 50 TABLET ORAL at 10:26

## 2022-08-18 RX ADMIN — TRAMADOL HYDROCHLORIDE 50 MILLIGRAM(S): 50 TABLET ORAL at 21:33

## 2022-08-18 RX ADMIN — ENOXAPARIN SODIUM 40 MILLIGRAM(S): 100 INJECTION SUBCUTANEOUS at 14:18

## 2022-08-18 RX ADMIN — Medication 3 MILLIGRAM(S): at 01:25

## 2022-08-18 RX ADMIN — Medication 1 TABLET(S): at 12:41

## 2022-08-18 RX ADMIN — TRAMADOL HYDROCHLORIDE 50 MILLIGRAM(S): 50 TABLET ORAL at 09:26

## 2022-08-18 NOTE — H&P ADULT - NSHPREVIEWOFSYSTEMS_GEN_ALL_CORE
REVIEW OF SYSTEMS  Constitutional: No fever, No Chills, No fatigue  HEENT: No eye pain, No visual disturbances, No difficulty hearing  Pulm: No cough,  No shortness of breath  Cardio: No chest pain, No palpitations  GI:  No abdominal pain, No nausea, No vomiting, No diarrhea, No constipation  : No dysuria, No frequency, No hematuria  Neuro: No headaches, No memory loss, No loss of strength, No numbness, No tremors  Skin: No itching, No rashes, No lesions   Endo: No temperature intolerance  MSK: Right groin/ leg pain, No joint swelling, No muscle pain, No Neck or back pain  Psych:  No depression, No anxiety

## 2022-08-18 NOTE — H&P ADULT - NS ATTEND AMEND GEN_ALL_CORE FT
Rehab Attending- Patient seen and examined by me- Case discussed, above note reviewed by me with modifications made    86 yo female SP fall Right pelvic Fracture, multiple spinal compression Fx now admitted to [facility] for intensive rehab to restore functional independence    Currently reports pain in right Leg/groin  no reported back pain  Moved bowels x2, voiding continent  No sleep last night- has One to one - attempting to climb OOB    MEDICATIONS  (STANDING):  enoxaparin Injectable 40 milliGRAM(s) SubCutaneous every 24 hours  multivitamin 1 Tablet(s) Oral daily  QUEtiapine 12.5 milliGRAM(s) Oral at bedtime    MEDICATIONS  (PRN):  acetaminophen     Tablet .. 650 milliGRAM(s) Oral every 6 hours PRN Temp greater or equal to 38C (100.4F), Mild Pain (1 - 3)  aluminum hydroxide/magnesium hydroxide/simethicone Suspension 30 milliLiter(s) Oral every 4 hours PRN Dyspepsia  melatonin 3 milliGRAM(s) Oral at bedtime PRN Insomnia  senna 2 Tablet(s) Oral at bedtime PRN Constipation  traMADol 50 milliGRAM(s) Oral every 6 hours PRN Moderate Pain (4 - 6)                            11.8   7.62  )-----------( 218      ( 19 Aug 2022 06:55 )             35.9     08-19    136  |  99  |  19  ----------------------------<  103<H>  3.9   |  27  |  0.94    Ca    9.6      19 Aug 2022 06:55    TPro  7.9  /  Alb  3.6  /  TBili  0.5  /  DBili  x   /  AST  20  /  ALT  16  /  AlkPhos  107  08-19        CAPILLARY BLOOD GLUCOSE          Vital Signs Last 24 Hrs  T(C): 36.7 (19 Aug 2022 08:27), Max: 37.1 (18 Aug 2022 19:57)  T(F): 98 (19 Aug 2022 08:27), Max: 98.7 (18 Aug 2022 19:57)  HR: 85 (19 Aug 2022 08:27) (84 - 91)  BP: 135/79 (19 Aug 2022 08:27) (105/74 - 141/90)  BP(mean): --  RR: 16 (19 Aug 2022 08:27) (15 - 16)  SpO2: 98% (19 Aug 2022 08:27) (97% - 100%)    Parameters below as of 19 Aug 2022 08:27  Patient On (Oxygen Delivery Method): room air        On exam- A&Ox2- month March ,year 1941  follows commands  right LE with FROM - MS hip Flexion 3-/5, quads 4/5 DF/PF 5/5  sensation intact  no elicited pain on palpation of CTL spine  +kyphosis/ scoliosis      labs reviewed by me- stable      IMP  Rehab gait ab SP Right Pelvic Fx, Multiple spinal compression Fx  WBAT RLE- Ortho consult pending  no intervention for spinal compression fx- old- no pain  Good candidate for intensive rehab - will tolerate 3 hours rehab daily  Poor sleep/ confusion- ? sundowning- will start low dose seroquel HS- continue One to one  pain management- tylenol, tramadol PRN

## 2022-08-18 NOTE — H&P ADULT - ASSESSMENT
ASSESSMENT/PLAN  This is a 88 YO female with no significant past medical history s/p mechanical fall on 8/9. CT lumbar showed multiple chronic appearing compression fractures. Severe collapse of L1 with mild bony retropulsion.CT pelvis: acute minimally displaced right pubic body fx with extension to Rt inferior pubic ramus. Acute minimally displaced comminuted fx of right puboacetabular junction. moderate age indeterminate compression fx of L4, worse. 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 Patient now with gait Instability, ADL impairments and Functional impairments.    #Acute right pelvic fx  #Multiple lumbar compression fx. Severe collapse of L1 with mild bony retropulsion  - Start Comprehensive Rehab Program: PT/OT, 3hours daily and 5 days weekly  - PT: Focused on improving strength, endurance, coordination, balance, functional mobility, and transfers  - OT: Focused on improving strength, fine motor skills, coordination, posture and ADLs.      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   - CT pelvis showed 1.7cm aneurysmal dilation of bilateral common iliac arteries.   - follow up vascular surgery as outpt    #Dyspepsia  - Maalox    #Pain management  - Tylenol PRN, Tramadol PRN    #DVT ppx  - Lovenox, SCD, TEDs    #Bowel Regimen  - Senna    #Bladder management  - BS on admission, and q 8 hours (SC if > 400)  - Monitor UO    #FEN   - Diet: Regular  - Supplements: MVI    #Skin:  - No active issues at this time  - Pressure injury/Skin: Turn Q2hrs while in bed, OOB to Chair, PT/OT     #Precaution  - Fall    Outpatient Follow-up (Specialty/Name of physician):    PCP    MEDICAL PROGNOSIS: GOOD            REHAB POTENTIAL: GOOD             ESTIMATED DISPOSITION: HOME WITH HOME CARE            ELOS: 10-14 Days   EXPECTED THERAPY:     P.T. 1hr/day       O.T. 1hr/day           P&O Unnecessary     EXP FREQUENCY: 5 days per 7 day period     PRESCREEN COMPARISON:   I have reviewed the prescreen information and I have found no relevant changes between the preadmission screening and my post admission evaluation     RATIONALE FOR INPATIENT ADMISSION - Patient demonstrates the following: (check all that apply)  [X] Medically appropriate for rehabilitation admission  [X] Has attainable rehab goals with an appropriate initial discharge plan  [X] Has rehabilitation potential (expected to make a significant improvement within a reasonable period of time)   [X] Requires close medical management by a rehab physician, rehab nursing care, Hospitalist and comprehensive interdisciplinary team (including PT, OT, & or SLP, Prosthetics and Orthotics)   ASSESSMENT/PLAN  This is a 86 YO female with no significant past medical history s/p mechanical fall on 8/9. CT lumbar showed multiple chronic appearing compression fractures. Severe collapse of L1 with mild bony retropulsion.CT pelvis: acute minimally displaced right pubic body fx with extension to Rt inferior pubic ramus. Acute minimally displaced comminuted fx of right puboacetabular junction. moderate age indeterminate compression fx of L4, worse. 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 Patient now with gait Instability, ADL impairments and Functional impairments.    #Acute right pelvic fx  #Multiple lumbar compression fx. Severe collapse of L1 with mild bony retropulsion  - Start Comprehensive Rehab Program: PT/OT, 3hours daily and 5 days weekly  - PT: Focused on improving strength, endurance, coordination, balance, functional mobility, and transfers  - OT: Focused on improving strength, fine motor skills, coordination, posture and ADLs.      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   - CT pelvis showed 1.7cm aneurysmal dilation of bilateral common iliac arteries.   - follow up vascular surgery as outpt    #Dyspepsia  - Maalox    #Pain management  - Tylenol PRN, Tramadol PRN    #DVT ppx  - Lovenox, SCD, TEDs    #Bowel Regimen  - Senna    #Bladder management  - BS on admission, and q 8 hours (SC if > 400)  - Monitor UO    #FEN   - Diet: Regular  - Supplements: MVI    #Skin:  - No active issues at this time  - Pressure injury/Skin: Turn Q2hrs while in bed, OOB to Chair, PT/OT     #Precaution  - Fall    Outpatient Follow-up (Specialty/Name of physician):    PCP    MEDICAL PROGNOSIS: GOOD            REHAB POTENTIAL: GOOD             ESTIMATED DISPOSITION: HOME WITH HOME CARE            ELOS: 10-14 Days   EXPECTED THERAPY:     P.T. 2hr/day       O.T. 1hr/day           P&O Unnecessary     EXP FREQUENCY: 5 days per 7 day period     PRESCREEN COMPARISON:   I have reviewed the prescreen information and I have found no relevant changes between the preadmission screening and my post admission evaluation     RATIONALE FOR INPATIENT ADMISSION - Patient demonstrates the following: (check all that apply)  [X] Medically appropriate for rehabilitation admission  [X] Has attainable rehab goals with an appropriate initial discharge plan  [X] Has rehabilitation potential (expected to make a significant improvement within a reasonable period of time)   [X] Requires close medical management by a rehab physician, rehab nursing care, Hospitalist and comprehensive interdisciplinary team (including PT, OT, & or SLP, Prosthetics and Orthotics)   ASSESSMENT/PLAN  This is a 86 YO female with no significant past medical history s/p mechanical fall on 8/9. CT lumbar showed multiple chronic appearing compression fractures. Severe collapse of L1 with mild bony retropulsion.CT pelvis: acute minimally displaced right pubic body fx with extension to Rt inferior pubic ramus. Acute minimally displaced comminuted fx of right puboacetabular junction. moderate age indeterminate compression fx of L4, worse. 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 Patient now with gait Instability, ADL impairments and Functional impairments.    #Acute right pelvic fx  #Multiple lumbar compression fx. Severe collapse of L1 with mild bony retropulsion  - Start Comprehensive Rehab Program: PT/OT, 3hours daily and 5 days weekly  - PT: Focused on improving strength, endurance, coordination, balance, functional mobility, and transfers  - OT: Focused on improving strength, fine motor skills, coordination, posture and ADLs.    Not seen by ortho- chronic compression fractures in back- no need for intervention -? 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   - CT pelvis showed 1.7cm aneurysmal dilation of bilateral common iliac arteries.   - follow up vascular surgery as outpt    #Dyspepsia  - Maalox    #Pain management  - Tylenol PRN, Tramadol PRN    #DVT ppx  - Lovenox, SCD, TEDs    #Bowel Regimen  - Senna    #Bladder management  - BS on admission, and q 8 hours (SC if > 400)  - Monitor UO    #FEN   - Diet: Regular  - Supplements: MVI    #Skin:  - No active issues at this time  - Pressure injury/Skin: Turn Q2hrs while in bed, OOB to Chair, PT/OT     #Precaution  - Fall    Outpatient Follow-up (Specialty/Name of physician):    PCP    MEDICAL PROGNOSIS: GOOD            REHAB POTENTIAL: GOOD             ESTIMATED DISPOSITION: HOME WITH HOME CARE            ELOS: 10-14 Days   EXPECTED THERAPY:     P.T. 2hr/day       O.T. 1hr/day           P&O Unnecessary     EXP FREQUENCY: 5 days per 7 day period     PRESCREEN COMPARISON:   I have reviewed the prescreen information and I have found no relevant changes between the preadmission screening and my post admission evaluation     RATIONALE FOR INPATIENT ADMISSION - Patient demonstrates the following: (check all that apply)  [X] Medically appropriate for rehabilitation admission  [X] Has attainable rehab goals with an appropriate initial discharge plan  [X] Has rehabilitation potential (expected to make a significant improvement within a reasonable period of time)   [X] Requires close medical management by a rehab physician, rehab nursing care, Hospitalist and comprehensive interdisciplinary team (including PT, OT, & or SLP, Prosthetics and Orthotics)

## 2022-08-18 NOTE — OCCUPATIONAL THERAPY INITIAL EVALUATION ADULT - PERTINENT HX OF CURRENT PROBLEM, REHAB EVAL
Pt s/p mechanical fall 8/9, went to ED for right hip pain and difficulty walking, xrays were negative and pt discharged home. Pain persisted and pt continued to have difficulty walking. Pt returned to ED 8/17, CT showed acute right pubic body fracture and multiple chronic lumbar compression fractures.

## 2022-08-18 NOTE — PROGRESS NOTE ADULT - ASSESSMENT
87y f with no significant past medical history s/p mechanical fall last tuesday now presents to ED c/o worsening right hip pain and ambulatory difficulty.     #Acute right pelvic fx  #Multiple lumbar compression fx. 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   - CT pelvis showed 1.7cm aneurysmal dilation of bilateral common iliac arteries.   - follow up vascular surgery as outpt    dvt ppx : lovenox subq    updated pt's daughter by bedside.   d/w ortho team

## 2022-08-18 NOTE — OCCUPATIONAL THERAPY INITIAL EVALUATION ADULT - GENERAL OBSERVATIONS, REHAB EVAL
Conferred with RN, pt ok to be seen for OT IE. Pt greeted in bed with daughter at bedside. Pt slightly confused/forgetful but pleasant and cooperative. Pt tolerated OT IE. Vitals stable. Pt left in bed with call bell, bed rails up. NAD

## 2022-08-18 NOTE — PHYSICAL THERAPY INITIAL EVALUATION ADULT - ADDITIONAL COMMENTS
pt lives w/spouse in private house, 4 herber w/rail, 1 flight to br. pt was independent w/ambulation and ADL's prior to fall, has been using rw for past 2 days

## 2022-08-18 NOTE — CONSULT NOTE ADULT - SUBJECTIVE AND OBJECTIVE BOX
Patient is a 87y old  Female who presents with a chief complaint of right hip pain (18 Aug 2022 12:39)    HPI:  This is a 86 YO female with no significant past medical history s/p mechanical fall on 8/9. Patient  was seen in ED. ct head and c-spine negative at that time. x-ray pelvis also neg. pt was discharged home and since then c/o worsening pain in the right hip when putting weight on it. Pt usually ambulates without any assistive device prior to the fall. Pt reports there is no pain at rest but pain usually worse with weight bearing and now having difficulty with ambulation. Pain usually relieves with tylenol. Pt admits to intermittent lower back pain but usually triggers by prolonged sitting.Pt denies numbness, tingling, swelling, fever, chills, n/v, abd pain, dysuria, urinary or bowel changes.    CT head today showed no acute intracranial trauma. Right frontal scalp soft tissue swelling. CT lumbar showed multiple chronic appearing compression fractures. Severe collapse of L1 with mild bony retropulsion  CT pelvis: acute minimally displaced right pubic body fx with extension to Rt inferior pubic ramus. Acute minimally displaced comminuted fx of right puboacetabular junction. moderate age indeterminate compression fx of L4, worse when compared to prior study. 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 No surgical intervention.    Patient was seen in room with daughter. She verbalized pain rated 10/10 to lower back and right hip with movement. She denies pain when laying in bed. She denies cough, HA, n/v/d.     REVIEW OF SYSTEMS  Constitutional: No fever, No Chills, No fatigue  HEENT: No eye pain, No visual disturbances, No difficulty hearing  Pulm: No cough,  No shortness of breath  Cardio: No chest pain, No palpitations  GI:  No abdominal pain, No nausea, No vomiting, No diarrhea, No constipation  : No dysuria, No frequency, No hematuria  Neuro: No headaches, + short term memory loss, + loss of strength, no numbness, No tremors  Skin: No itching, No rashes, No lesions   Endo: No temperature intolerance  MSK: + joint pain, No joint swelling, + muscle pain, + back pain  Psych:  No depression, No anxiety    PAST MEDICAL & SURGICAL HISTORY  No pertinent past medical history      FAMILY HISTORY   No pertinent family history in first degree relatives    FH: stroke    SOCIAL HISTORY  Smoking - Denied  EtOH - Denied  Drugs - Denied    FUNCTIONAL HISTORY:   Patient lives in  with her . She has 6 BRENNAN, 8+5+8 steps inside   Independent in ambulations and ADLs      CURRENT FUNCTIONAL STATUS:  Bed Mobility: Min A, 1 person  Transfers: Min A, 2 person  Gait: Min A, 2 person  Upper body dressing: supervision  Lower Body dressing: Max A     RECENT LABS/IMAGING  CBC Full  -  ( 18 Aug 2022 07:31 )  WBC Count : 6.31 K/uL  RBC Count : 3.64 M/uL  Hemoglobin : 11.1 g/dL  Hematocrit : 33.5 %  Platelet Count - Automated : 192 K/uL  Mean Cell Volume : 92.0 fl  Mean Cell Hemoglobin : 30.5 pg  Mean Cell Hemoglobin Concentration : 33.1 gm/dL  Auto Neutrophil # : 4.29 K/uL  Auto Lymphocyte # : 1.29 K/uL  Auto Monocyte # : 0.45 K/uL  Auto Eosinophil # : 0.21 K/uL  Auto Basophil # : 0.05 K/uL  Auto Neutrophil % : 68.1 %  Auto Lymphocyte % : 20.4 %  Auto Monocyte % : 7.1 %  Auto Eosinophil % : 3.3 %  Auto Basophil % : 0.8 %    08-18    138  |  99  |  18  ----------------------------<  102<H>  3.5   |  28  |  0.84    Ca    9.4      18 Aug 2022 07:31    TPro  7.7  /  Alb  3.6  /  TBili  0.4  /  DBili  x   /  AST  24  /  ALT  18  /  AlkPhos  95  08-17     Xray Chest 1 View- PORTABLE-Urgent (08.17.22 @ 16:31)     IMPRESSION:  Elevated left hemidiaphragm again seen.    Increased reticularand mild patchy right upper lung opacities, nonspecific findings. Atelectasis, infection, and/or scarring or possible.    Left midlung linear atelectasis and/or scar.    CT Lumbar Spine No Cont (08.17.22 @ 15:09)   IMPRESSION:    1. Multiple chronic appearing compression fracture deformities inclusive of T12-L4. Most notable is severe collapse of L1 with mild bony retropulsion. There is increasing loss of height of multiple vertebrae as compared to the prior study. See above.  2. Follow-up MR imaging of the lumbar spine recommended for further assessment.      CT Pelvis Bony Only No Cont (08.17.22 @ 15:08)   IMPRESSION:    Acute minimally displaced fracture of the right pubic body with slight extension to the right inferior pubic ramus.    Acute minimally displaced comminuted fracture of the right puboacetabular   junction.    Moderate age-indeterminate compression fracture of L4, which appears worse when compared to the prior study. Stable mild chronic compression fracture of L5. Aneurysmal dilatation of bilateral common iliac arteries.    CT Head No Cont (08.08.22 @ 22:42)   Impression: No acute intracranial trauma or evidence of acute cervical spine injury. High right frontal scalp soft tissue swelling/hematoma.      VITALS  T(C): 36.7 (08-18-22 @ 13:52), Max: 36.7 (08-18-22 @ 13:52)  HR: 83 (08-18-22 @ 13:52) (60 - 87)  BP: 100/66 (08-18-22 @ 13:52) (100/66 - 137/65)  RR: 16 (08-18-22 @ 13:52) (16 - 18)  SpO2: 96% (08-18-22 @ 13:52) (96% - 100%)  Wt(kg): --    ALLERGIES  No Known Allergies      MEDICATIONS   acetaminophen     Tablet .. 650 milliGRAM(s) Oral every 6 hours PRN  aluminum hydroxide/magnesium hydroxide/simethicone Suspension 30 milliLiter(s) Oral every 4 hours PRN  enoxaparin Injectable 40 milliGRAM(s) SubCutaneous every 24 hours  melatonin 3 milliGRAM(s) Oral at bedtime PRN  multivitamin 1 Tablet(s) Oral daily  ondansetron Injectable 4 milliGRAM(s) IV Push every 8 hours PRN  traMADol 50 milliGRAM(s) Oral every 6 hours PRN    ----------------------------------------------------------------------------------------  PHYSICAL EXAM  Gen - NAD, Comfortable  HEENT - EOMI, MMM, right frontal scalp edema   Neck - Supple, No limited ROM  Pulm - CTAB, No wheeze, No rhonchi, No crackles  Cardiovascular - RRR, S1S2, No murmurs  Abdomen - Soft, NT/ND, +BS  Extremities - No clubbing, no cyanosis, no peripheral edema, no calf tenderness  Neuro-     Cognitive - Awake, Alert, Oriented  to self, place     Communication - Fluent,     Attention: Intact      Judgement: Good evidence of judgement     Memory: Recall 3 objects immediate and 3 min later         Cranial Nerves - CN 2-12 intact. No facial asymmetry, Tongue midline, EOMI, Shoulder shrug intact     Motor -                     LEFT    UE - ShAB 5/5, EF 5/5, EE 5/5,  5/5                    RIGHT UE - ShAB 5/5, EF 5/5, EE 5/5,   5/5                    LEFT    LE - HF 4/5, KE 4/5, DF 5/5, PF 5/5                    RIGHT LE - HF 2/5- Limited by pain, KE 3/5, DF 5/5, PF 5/5        Sensory - Intact to LT     Reflexes - DTR Intact, No primitive reflexive     Coordination - FTN intact/ HTS intact     Tone - normal  Psychiatric - Mood stable, Affect WNL  Skin:  all skin intact                    Patient is a 87y old  Female who presents with a chief complaint of right hip pain (18 Aug 2022 12:39)    HPI:  This is a 88 YO female with no significant past medical history s/p mechanical fall on 8/9. Patient  was seen in ED. ct head and c-spine negative at that time. x-ray pelvis also neg. pt was discharged home and since then c/o worsening pain in the right hip when putting weight on it. Pt usually ambulates without any assistive device prior to the fall. Pt reports there is no pain at rest but pain usually worse with weight bearing and now having difficulty with ambulation. Pain usually relieves with tylenol. Pt admits to intermittent lower back pain but usually triggers by prolonged sitting.Pt denies numbness, tingling, swelling, fever, chills, n/v, abd pain, dysuria, urinary or bowel changes.    CT head today showed no acute intracranial trauma. Right frontal scalp soft tissue swelling. CT lumbar showed multiple chronic appearing compression fractures. Severe collapse of L1 with mild bony retropulsion  CT pelvis: acute minimally displaced right pubic body fx with extension to Rt inferior pubic ramus. Acute minimally displaced comminuted fx of right puboacetabular junction. moderate age indeterminate compression fx of L4, worse when compared to prior study. 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 No surgical intervention.    Patient was seen in room with daughter. She verbalized pain rated 10/10 to lower back and right hip with movement. She denies pain when laying in bed. She denies cough, HA, n/v/d.     REVIEW OF SYSTEMS  Constitutional: No fever, No Chills, No fatigue  HEENT: No eye pain, No visual disturbances, No difficulty hearing  Pulm: No cough,  No shortness of breath  Cardio: No chest pain, No palpitations  GI:  No abdominal pain, No nausea, No vomiting, No diarrhea, No constipation  : No dysuria, No frequency, No hematuria  Neuro: No headaches, + short term memory loss, + loss of strength, no numbness, No tremors  Skin: No itching, No rashes, No lesions   Endo: No temperature intolerance  MSK: + joint pain, No joint swelling, + muscle pain, + back pain  Psych:  No depression, No anxiety    PAST MEDICAL & SURGICAL HISTORY  No pertinent past medical history      FAMILY HISTORY   No pertinent family history in first degree relatives    FH: stroke    SOCIAL HISTORY  Smoking - Denied  EtOH - Denied  Drugs - Denied    FUNCTIONAL HISTORY:   Patient lives in  with her . She has 6 BRENNAN, 8+5+8 steps inside   Independent in ambulations and ADLs      CURRENT FUNCTIONAL STATUS:  Bed Mobility: Min A, 1 person  Transfers: Min A, 2 person  Gait: Min A, 2 person  Upper body dressing: supervision  Lower Body dressing: Max A     RECENT LABS/IMAGING  CBC Full  -  ( 18 Aug 2022 07:31 )  WBC Count : 6.31 K/uL  RBC Count : 3.64 M/uL  Hemoglobin : 11.1 g/dL  Hematocrit : 33.5 %  Platelet Count - Automated : 192 K/uL  Mean Cell Volume : 92.0 fl  Mean Cell Hemoglobin : 30.5 pg  Mean Cell Hemoglobin Concentration : 33.1 gm/dL  Auto Neutrophil # : 4.29 K/uL  Auto Lymphocyte # : 1.29 K/uL  Auto Monocyte # : 0.45 K/uL  Auto Eosinophil # : 0.21 K/uL  Auto Basophil # : 0.05 K/uL  Auto Neutrophil % : 68.1 %  Auto Lymphocyte % : 20.4 %  Auto Monocyte % : 7.1 %  Auto Eosinophil % : 3.3 %  Auto Basophil % : 0.8 %    08-18    138  |  99  |  18  ----------------------------<  102<H>  3.5   |  28  |  0.84    Ca    9.4      18 Aug 2022 07:31    TPro  7.7  /  Alb  3.6  /  TBili  0.4  /  DBili  x   /  AST  24  /  ALT  18  /  AlkPhos  95  08-17     Xray Chest 1 View- PORTABLE-Urgent (08.17.22 @ 16:31)     IMPRESSION:  Elevated left hemidiaphragm again seen.    Increased reticularand mild patchy right upper lung opacities, nonspecific findings. Atelectasis, infection, and/or scarring or possible.    Left midlung linear atelectasis and/or scar.    CT Lumbar Spine No Cont (08.17.22 @ 15:09)   IMPRESSION:    1. Multiple chronic appearing compression fracture deformities inclusive of T12-L4. Most notable is severe collapse of L1 with mild bony retropulsion. There is increasing loss of height of multiple vertebrae as compared to the prior study. See above.  2. Follow-up MR imaging of the lumbar spine recommended for further assessment.      CT Pelvis Bony Only No Cont (08.17.22 @ 15:08)   IMPRESSION:    Acute minimally displaced fracture of the right pubic body with slight extension to the right inferior pubic ramus.    Acute minimally displaced comminuted fracture of the right puboacetabular   junction.    Moderate age-indeterminate compression fracture of L4, which appears worse when compared to the prior study. Stable mild chronic compression fracture of L5. Aneurysmal dilatation of bilateral common iliac arteries.    CT Head No Cont (08.08.22 @ 22:42)   Impression: No acute intracranial trauma or evidence of acute cervical spine injury. High right frontal scalp soft tissue swelling/hematoma.      VITALS  T(C): 36.7 (08-18-22 @ 13:52), Max: 36.7 (08-18-22 @ 13:52)  HR: 83 (08-18-22 @ 13:52) (60 - 87)  BP: 100/66 (08-18-22 @ 13:52) (100/66 - 137/65)  RR: 16 (08-18-22 @ 13:52) (16 - 18)  SpO2: 96% (08-18-22 @ 13:52) (96% - 100%)  Wt(kg): --    ALLERGIES  No Known Allergies      MEDICATIONS   acetaminophen     Tablet .. 650 milliGRAM(s) Oral every 6 hours PRN  aluminum hydroxide/magnesium hydroxide/simethicone Suspension 30 milliLiter(s) Oral every 4 hours PRN  enoxaparin Injectable 40 milliGRAM(s) SubCutaneous every 24 hours  melatonin 3 milliGRAM(s) Oral at bedtime PRN  multivitamin 1 Tablet(s) Oral daily  ondansetron Injectable 4 milliGRAM(s) IV Push every 8 hours PRN  traMADol 50 milliGRAM(s) Oral every 6 hours PRN    ----------------------------------------------------------------------------------------  PHYSICAL EXAM  Gen - NAD, Comfortable  HEENT - EOMI, MMM, right frontal scalp edema   Neck - Supple, No limited ROM  Pulm - CTAB, No wheeze, No rhonchi, No crackles  Cardiovascular - RRR, S1S2, No murmurs  Abdomen - Soft, NT/ND, +BS  Extremities - No clubbing, no cyanosis, no peripheral edema, no calf tenderness  Neuro-     Cognitive - Awake, Alert, Oriented  to self, place     Communication - Fluent     Attention: Intact      Judgement: Good evidence of judgement     Memory: able to identify object, able to recall independently immediately, she needed cues to recall 2 objects 3 min later          Cranial Nerves - CN 2-12 intact     Motor -                     LEFT    UE - ShAB 5/5, EF 5/5, EE 5/5,  5/5                    RIGHT UE - ShAB 5/5, EF 5/5, EE 5/5,   5/5                    LEFT    LE - HF 4/5, KE 4/5, DF 5/5, PF 5/5                    RIGHT LE - HF 2/5- Limited by pain, KE 3/5, DF 5/5, PF 5/5        Sensory - Intact to LT     Reflexes - DTR Intact, No primitive reflexive     Coordination - FTN intact     Tone - normal  Psychiatric - Mood stable, Affect WNL  Skin:  all skin intact

## 2022-08-18 NOTE — H&P ADULT - HISTORY OF PRESENT ILLNESS
This is a 86 YO female with no significant past medical history s/p mechanical fall on 8/9. Patient  was seen in ED. ct head and c-spine negative at that time. x-ray pelvis also neg. pt was discharged home and since then c/o worsening pain in the right hip when putting weight on it. Pt usually ambulates without any assistive device prior to the fall. Pt reports there is no pain at rest but pain usually worse with weight bearing and now having difficulty with ambulation. Pain usually relieves with tylenol. Pt admits to intermittent lower back pain but usually triggers by prolonged sitting. Pt denies numbness, tingling, swelling, fever, chills, n/v, abd pain, dysuria, urinary or bowel changes.    CT head today showed no acute intracranial trauma. Right frontal scalp soft tissue swelling. CT lumbar showed multiple chronic appearing compression fractures. Severe collapse of L1 with mild bony retropulsion  CT pelvis: acute minimally displaced right pubic body fx with extension to Rt inferior pubic ramus. Acute minimally displaced comminuted fx of right puboacetabular junction. moderate age indeterminate compression fx of L4, worse when compared to prior study. 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 No surgical intervention. OP vasculat f/u.     Patient was evaluated by PM&R and therapy for functional deficits, gait/ADL impairments and acute rehabilitation was recommended. Patient was medically optimized for discharge to [facility] IRU on 8/18/22.

## 2022-08-18 NOTE — H&P ADULT - NSHPPHYSICALEXAM_GEN_ALL_CORE
Gen - NAD, Comfortable  HEENT - EOMI, MMM, right frontal scalp edema   Neck - Supple, No limited ROM  Pulm - CTAB, No wheeze, No rhonchi, No crackles  Cardiovascular - RRR, S1S2, No murmurs  Abdomen - Soft, NT/ND, +BS  Extremities - No clubbing, no cyanosis, no peripheral edema, no calf tenderness  Neuro-     Cognitive - Awake, Alert, Oriented  to self, place     Communication - Fluent     Attention: Intact      Judgement: Good evidence of judgement     Memory: Recall 3 objects immediate and 3 min later         Cranial Nerves - CN 2-12 intact     Motor -                     LEFT    UE - ShAB 5/5, EF 5/5, EE 5/5,  5/5                    RIGHT UE - ShAB 5/5, EF 5/5, EE 5/5,   5/5                    LEFT    LE - HF 4/5, KE 4/5, DF 5/5, PF 5/5                    RIGHT LE - HF 2/5- Limited by pain, KE 3/5, DF 5/5, PF 5/5        Sensory - Intact to LT     Reflexes - DTR Intact, No primitive reflexive     Coordination - FTN intact     Tone - normal  Psychiatric - Mood stable, Affect WNL  MSK: Kyphosis  Skin:  all skin intact Gen - NAD, Comfortable  HEENT - EOMI, MMM, right frontal scalp edema   Neck - Supple, No limited ROM  Pulm - CTAB, No wheeze, No rhonchi, No crackles  Cardiovascular - RRR, S1S2, No murmurs  Abdomen - Soft, NT/ND, +BS  Extremities - No clubbing, no cyanosis, no peripheral edema, no calf tenderness  Neuro-     Cognitive - Awake, Alert, Oriented to self, place     Communication - Fluent     Attention: Intact      Memory: able to identify object, able to recall independently immediately, she needed cues to recall 2 objects 3 min later        Cranial Nerves - CN 2-12 intact     Motor -                     LEFT    UE - ShAB 5/5, EF 5/5, EE 5/5,  5/5                    RIGHT UE - ShAB 5/5, EF 5/5, EE 5/5,   5/5                    LEFT    LE - HF 4/5, KE 4/5, DF 5/5, PF 5/5                    RIGHT LE - HF 2/5- Limited by pain, KE 3/5, DF 5/5, PF 5/5        Sensory - Intact to LT     Reflexes - DTR Intact, No primitive reflexive     Coordination - FTN intact     Tone - normal  Psychiatric - Mood stable, Affect WNL  MSK: Kyphosis  Skin:  all skin intact Gen - NAD, Comfortable  HEENT - EOMI, MMM, right frontal scalp edema   Neck - Supple, No limited ROM  Pulm - CTAB, No wheeze, No rhonchi, No crackles  Cardiovascular - RRR, S1S2, No murmurs  Abdomen - Soft, NT/ND, +BS  Extremities - No clubbing, no cyanosis, no peripheral edema, no calf tenderness  Neuro-     Cognitive - Awake, Alert, Oriented to self, place- not time     Communication - Fluent     Attention: Intact      Memory: able to identify object, able to recall independently immediately, she needed cues to recall 2 objects 3 min later        Cranial Nerves - CN 2-12 intact     Motor -                     LEFT    UE - ShAB 5/5, EF 5/5, EE 5/5,  5/5                    RIGHT UE - ShAB 5/5, EF 5/5, EE 5/5,   5/5                    LEFT    LE - HF 4/5, KE 4/5, DF 5/5, PF 5/5                    RIGHT LE - HF 2/5- Limited by pain, KE 4/5, DF 5/5, PF 5/5        Sensory - Intact to LT     Reflexes - DTR Intact, No primitive reflexive     Coordination - FTN intact     Tone - normal  Psychiatric - Mood stable, Affect WNL  MSK: Kyphosis- no localized back pain on palpation  Skin:  all skin intact

## 2022-08-18 NOTE — PATIENT PROFILE ADULT - FALL HARM RISK - HARM RISK INTERVENTIONS

## 2022-08-18 NOTE — PATIENT PROFILE ADULT - FALL HARM RISK - HARM RISK INTERVENTIONS

## 2022-08-18 NOTE — H&P ADULT - NSHPSOCIALHISTORY_GEN_ALL_CORE
"ED Provider Note    Scribed for Dallin Hernández M.D. by Rhonda Leon. 1/1/2020,  11:19 PM.    Means of Arrival: Walk in   History obtained from: Patient   History limited by: None     CHIEF COMPLAINT  Chief Complaint   Patient presents with   • Open Wound     Pt reports puslike pimple to left wrist 2 days ago, squeezed pimple yesterday/ puslike drainage sqeezed out per pt. Today there is an open wound with redness/ swelling to left wrist. Bruising noted to outside edges of wound.        HPI  Sandeep Watson is a 38 y.o. male who presents to the Emergency Department for an open wound on his left wrist onset yesterday. The patient states that he thought it was a pimple, so he popped it which produced purulent drainage. He reports associated bruising the the area around the wound. He denies fever. He also denies any medical problems.      REVIEW OF SYSTEMS  CONSTITUTIONAL:  No fever.  SKIN: Open wound.    See HPI for further details.     PAST MEDICAL HISTORY  History reviewed. No pertinent past medical history.    FAMILY HISTORY  History reviewed. No pertinent family history.    SOCIAL HISTORY   reports that he has been smoking cigarettes. He has a 17.00 pack-year smoking history. He has never used smokeless tobacco. He reports previous alcohol use. He reports current drug use. Drug: Inhaled.    SURGICAL HISTORY  History reviewed. No pertinent surgical history.    CURRENT MEDICATIONS  Home Medications    **Home medications have not yet been reviewed for this encounter**         ALLERGIES  No Known Allergies      PHYSICAL EXAM  VITAL SIGNS: /90   Pulse 100   Temp 36 °C (96.8 °F) (Oral)   Resp 20   Ht 1.702 m (5' 7\")   Wt 75 kg (165 lb 5.5 oz)   SpO2 97%   BMI 25.90 kg/m²    Gen: alert, no acute distress  CV: Heart regular, no murmurs.  Extremities: Full ROM of hand. 4mm open wound on dorsal aspect of the left wrist with some erythema and ecchymosis.   Skin: No splinter hemorrhages.     COURSE & MEDICAL " DECISION MAKING  Pertinent Labs & Imaging studies reviewed. (See chart for details)    11:19 PM Patient seen and examined at bedside. Informed patient that I will prescribe him a course of antibiotics. Patient informed that he will be discharged home and was given the opportunity to ask any questions. Discussed return precautions and encouraged him to return for any new or worsening symptoms. Patient verbalizes understanding and agreement to this plan of care.     Medical Decision Making:  Patient presents with abscess with surrounding cellulitis.  It appears the abscess spontaneously drained.  I attempted to unroofed the next layer of tissue, however no pus was expressed.  Patient will be started antibiotics.  He has no murmur or other stigmata of endocarditis.  He has no fevers.  Patient is otherwise well-appearing.  He will be given antibiotics discharged home, and provided with return precautions.    The patient will return for new or worsening symptoms and is stable at the time of discharge.    The patient is referred to a primary physician for blood pressure management, diabetic screening, and for all other preventative health concerns.      DISPOSITION:  Patient will be discharged home in stable condition.    FOLLOW UP:  Your regular doctor    In 3 days      [facility], Emergency Dept  [address] [address] [phone]    If symptoms worsen    [address] [address] [phone]    As needed    [address] [address] [phone]    As needed      OUTPATIENT MEDICATIONS:  New Prescriptions    SULFAMETHOXAZOLE-TRIMETHOPRIM (BACTRIM DS) 800-160 MG TABLET    Take 1 Tab by mouth 2 times a day for 5 days.       FINAL IMPRESSION  1. Abscess    2. Cellulitis of left upper extremity            IRhonda)jed scribing for, and in the presence of, Dallin Hernández,  M.D..    Electronically signed by: Rhonda Leon (Scribe), 1/1/2020    I, Dallin Hernández M.D. personally performed the services described in this documentation, as scribed by Rhonda Leon in my presence, and it is both accurate and complete. E    The note accurately reflects work and decisions made by me.  Dallin Hernández  1/1/2020  11:37 PM      Smoking - Denied  EtOH - Denied   Drugs - Denied     Patient lives PH ( multiple level) with her . She has 6 BRENNAN, 8+5+8 steps inside   PTA: Independent in ADLs and ambulation     CURRENT FUNCTIONAL STATUS 8/18  Bed Mobility: Min A, 1 person  Transfers: Min A, 2 person  Gait: Min A, 2 person  Upper body dressing: supervision  Lower Body dressing: Max A

## 2022-08-18 NOTE — OCCUPATIONAL THERAPY INITIAL EVALUATION ADULT - ADDITIONAL COMMENTS
Pt with difficulty recalling, daughter assisted in providing prior level.   Pt lives with spouse in private home, 4 BRENNAN, 3 level home. Bedroom and bathroom on 3rd floor, bathroom has tub shower and standard toilet. Main level has 1/2 bath. Pt was not using any DME prior to fall. Daughter got her a RW. Daughter also has TTB she can use.

## 2022-08-18 NOTE — CONSULT NOTE ADULT - ASSESSMENT
------------------------------------------------------------------------------------------------  ASSESSMENT/PLAN  This is a 88 YO female with no significant past medical history s/p mechanical fall on 8/9.  CT lumbar showed multiple chronic appearing compression fractures. Severe collapse of L1 with mild bony retropulsion  CT pelvis: acute minimally displaced right pubic body fx with extension to Rt inferior pubic ramus. Acute minimally displaced comminuted fx of right puboacetabular junction. moderate age indeterminate compression fx of L4, worse. 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 No surgical intervention.  Medical management per hospitalist  Pain management -Tylenol, Tramadol  DVT PPX : SCDs, lovenox  Bowel Regimen: senna  WB status: WBAT  Lumbar support brace    Rehab Disposition: -      Recommend ACUTE inpatient rehabilitation for the functional deficits consisting of 3 hours of therapy/day & 24 hour RN/daily PMR physician for comorbid medical management. Will continue to follow for ongoing rehab needs and recommendations. Patient will be able to tolerate 3 hours a day.

## 2022-08-18 NOTE — PROGRESS NOTE ADULT - SUBJECTIVE AND OBJECTIVE BOX
Patient is a 87y old  Female who presents with a chief complaint of right hip pain (17 Aug 2022 18:28)      INTERVAL History of Present Illness/OVERNIGHT EVENTS: daughter at bedside  pt denies any back or pelvic pain at rest  poor historian due to dementia    MEDICATIONS  (STANDING):  multivitamin 1 Tablet(s) Oral daily    MEDICATIONS  (PRN):  acetaminophen     Tablet .. 650 milliGRAM(s) Oral every 6 hours PRN Temp greater or equal to 38C (100.4F), Mild Pain (1 - 3)  aluminum hydroxide/magnesium hydroxide/simethicone Suspension 30 milliLiter(s) Oral every 4 hours PRN Dyspepsia  melatonin 3 milliGRAM(s) Oral at bedtime PRN Insomnia  ondansetron Injectable 4 milliGRAM(s) IV Push every 8 hours PRN Nausea and/or Vomiting  traMADol 50 milliGRAM(s) Oral every 6 hours PRN Moderate Pain (4 - 6)      Allergies    No Known Allergies    Intolerances        REVIEW OF SYSTEMS:  Negative unless otherwise specified above.    Vital Signs Last 24 Hrs  T(C): 36.6 (18 Aug 2022 03:12), Max: 36.6 (17 Aug 2022 17:28)  T(F): 97.9 (18 Aug 2022 03:12), Max: 97.9 (18 Aug 2022 03:12)  HR: 87 (18 Aug 2022 03:12) (60 - 87)  BP: 137/65 (18 Aug 2022 03:12) (118/80 - 137/65)  BP(mean): --  RR: 17 (18 Aug 2022 03:12) (17 - 18)  SpO2: 100% (18 Aug 2022 03:12) (100% - 100%)    Parameters below as of 18 Aug 2022 03:12  Patient On (Oxygen Delivery Method): room air            PHYSICAL EXAM:  GENERAL: No apparent distress, appears stated age  HEAD:  Atraumatic, Normocephalic  EYES: Conjunctiva and sclera clear, no discharge  ENMT: Moist mucous membranes, no nasal discharge  NECK: Supple, no JVD  CHEST/LUNG: Kyphotic chest  Clear to auscultation bilaterally, no wheeze or rales  HEART: Regular rhythm, no rubs or gallops  ABDOMEN: Soft, Nontender, Nondistended  EXTREMITIES:  No clubbing, cyanosis or edema  SKIN: No rash, no new discoloration  NERVOUS SYSTEM:  Alert & Oriented; Bilateral Lower extremity mobile, sensation to light touch intact      LABS:                        11.1   6.31  )-----------( 192      ( 18 Aug 2022 07:31 )             33.5     18 Aug 2022 07:31    138    |  99     |  18     ----------------------------<  102    3.5     |  28     |  0.84     Ca    9.4        18 Aug 2022 07:31    TPro  7.7    /  Alb  3.6    /  TBili  0.4    /  DBili  x      /  AST  24     /  ALT  18     /  AlkPhos  95     17 Aug 2022 17:10        CAPILLARY BLOOD GLUCOSE          RADIOLOGY & ADDITIONAL TESTS:      Images reviewed personally    Consultant Notes Reviewed and Care Discussed with relevant Consultants.

## 2022-08-18 NOTE — H&P ADULT - NSHPLABSRESULTS_GEN_ALL_CORE
11.1   6.31  )-----------( 192      ( 18 Aug 2022 07:31 )             33.5     08-18    138  |  99  |  18  ----------------------------<  102<H>  3.5   |  28  |  0.84    Ca    9.4      18 Aug 2022 07:31    TPro  7.7  /  Alb  3.6  /  TBili  0.4  /  DBili  x   /  AST  24  /  ALT  18  /  AlkPhos  95  08-17      CT Lumbar Spine No Cont (08.17.22 @ 15:09)     IMPRESSION:    1. Multiple chronic appearing compression fracture deformities inclusive   of T12-L4. Most notable is severe collapse of L1 with mild bony   retropulsion. There is increasing loss of height of multiple vertebrae as   compared to the prior study. See above.  2. Follow-up MR imaging of the lumbar spine recommended for further   assessment.    CT Pelvis Bony Only No Cont (08.17.22 @ 15:08)     IMPRESSION:    Acute minimally displaced fracture of the right pubic body with slight   extension to the right inferior pubic ramus.    Acute minimally displaced comminuted fracture of the right puboacetabular junction.    Moderate age-indeterminate compression fracture of L4, which appears   worse when compared to the prior study. Stable mild chronic compression   fracture of L5. Aneurysmal dilatation of bilateral common iliac arteries.    CT Head No Cont (08.08.22 @ 22:42)     Impression: No acute intracranial trauma or evidence of acute cervical spine injury. High right frontal scalp soft tissue swelling/hematoma.

## 2022-08-19 DIAGNOSIS — S32.82XA MULTIPLE FRACTURES OF PELVIS WITHOUT DISRUPTION OF PELVIC RING, INITIAL ENCOUNTER FOR CLOSED FRACTURE: ICD-10-CM

## 2022-08-19 LAB
ALBUMIN SERPL ELPH-MCNC: 3.6 G/DL — SIGNIFICANT CHANGE UP (ref 3.3–5)
ALP SERPL-CCNC: 107 U/L — SIGNIFICANT CHANGE UP (ref 40–120)
ALT FLD-CCNC: 16 U/L — SIGNIFICANT CHANGE UP (ref 10–45)
ANION GAP SERPL CALC-SCNC: 10 MMOL/L — SIGNIFICANT CHANGE UP (ref 5–17)
AST SERPL-CCNC: 20 U/L — SIGNIFICANT CHANGE UP (ref 10–40)
BASOPHILS # BLD AUTO: 0.04 K/UL — SIGNIFICANT CHANGE UP (ref 0–0.2)
BASOPHILS NFR BLD AUTO: 0.5 % — SIGNIFICANT CHANGE UP (ref 0–2)
BILIRUB SERPL-MCNC: 0.5 MG/DL — SIGNIFICANT CHANGE UP (ref 0.2–1.2)
BUN SERPL-MCNC: 19 MG/DL — SIGNIFICANT CHANGE UP (ref 7–23)
CALCIUM SERPL-MCNC: 9.6 MG/DL — SIGNIFICANT CHANGE UP (ref 8.4–10.5)
CHLORIDE SERPL-SCNC: 99 MMOL/L — SIGNIFICANT CHANGE UP (ref 96–108)
CO2 SERPL-SCNC: 27 MMOL/L — SIGNIFICANT CHANGE UP (ref 22–31)
CREAT SERPL-MCNC: 0.94 MG/DL — SIGNIFICANT CHANGE UP (ref 0.5–1.3)
EGFR: 59 ML/MIN/1.73M2 — LOW
EOSINOPHIL # BLD AUTO: 0.17 K/UL — SIGNIFICANT CHANGE UP (ref 0–0.5)
EOSINOPHIL NFR BLD AUTO: 2.2 % — SIGNIFICANT CHANGE UP (ref 0–6)
GLUCOSE SERPL-MCNC: 103 MG/DL — HIGH (ref 70–99)
HCT VFR BLD CALC: 35.9 % — SIGNIFICANT CHANGE UP (ref 34.5–45)
HGB BLD-MCNC: 11.8 G/DL — SIGNIFICANT CHANGE UP (ref 11.5–15.5)
IMM GRANULOCYTES NFR BLD AUTO: 0.4 % — SIGNIFICANT CHANGE UP (ref 0–1.5)
LYMPHOCYTES # BLD AUTO: 1.54 K/UL — SIGNIFICANT CHANGE UP (ref 1–3.3)
LYMPHOCYTES # BLD AUTO: 20.2 % — SIGNIFICANT CHANGE UP (ref 13–44)
MCHC RBC-ENTMCNC: 31 PG — SIGNIFICANT CHANGE UP (ref 27–34)
MCHC RBC-ENTMCNC: 32.9 GM/DL — SIGNIFICANT CHANGE UP (ref 32–36)
MCV RBC AUTO: 94.2 FL — SIGNIFICANT CHANGE UP (ref 80–100)
MONOCYTES # BLD AUTO: 0.43 K/UL — SIGNIFICANT CHANGE UP (ref 0–0.9)
MONOCYTES NFR BLD AUTO: 5.6 % — SIGNIFICANT CHANGE UP (ref 2–14)
NEUTROPHILS # BLD AUTO: 5.41 K/UL — SIGNIFICANT CHANGE UP (ref 1.8–7.4)
NEUTROPHILS NFR BLD AUTO: 71.1 % — SIGNIFICANT CHANGE UP (ref 43–77)
NRBC # BLD: 0 /100 WBCS — SIGNIFICANT CHANGE UP (ref 0–0)
PLATELET # BLD AUTO: 218 K/UL — SIGNIFICANT CHANGE UP (ref 150–400)
POTASSIUM SERPL-MCNC: 3.9 MMOL/L — SIGNIFICANT CHANGE UP (ref 3.5–5.3)
POTASSIUM SERPL-SCNC: 3.9 MMOL/L — SIGNIFICANT CHANGE UP (ref 3.5–5.3)
PROT SERPL-MCNC: 7.9 G/DL — SIGNIFICANT CHANGE UP (ref 6–8.3)
RBC # BLD: 3.81 M/UL — SIGNIFICANT CHANGE UP (ref 3.8–5.2)
RBC # FLD: 13.1 % — SIGNIFICANT CHANGE UP (ref 10.3–14.5)
SODIUM SERPL-SCNC: 136 MMOL/L — SIGNIFICANT CHANGE UP (ref 135–145)
WBC # BLD: 7.62 K/UL — SIGNIFICANT CHANGE UP (ref 3.8–10.5)
WBC # FLD AUTO: 7.62 K/UL — SIGNIFICANT CHANGE UP (ref 3.8–10.5)

## 2022-08-19 PROCEDURE — 99222 1ST HOSP IP/OBS MODERATE 55: CPT | Mod: GC

## 2022-08-19 PROCEDURE — 99223 1ST HOSP IP/OBS HIGH 75: CPT

## 2022-08-19 RX ORDER — QUETIAPINE FUMARATE 200 MG/1
12.5 TABLET, FILM COATED ORAL AT BEDTIME
Refills: 0 | Status: DISCONTINUED | OUTPATIENT
Start: 2022-08-19 | End: 2022-08-22

## 2022-08-19 RX ADMIN — ENOXAPARIN SODIUM 40 MILLIGRAM(S): 100 INJECTION SUBCUTANEOUS at 12:04

## 2022-08-19 RX ADMIN — Medication 650 MILLIGRAM(S): at 06:01

## 2022-08-19 RX ADMIN — Medication 3 MILLIGRAM(S): at 21:27

## 2022-08-19 RX ADMIN — Medication 650 MILLIGRAM(S): at 06:29

## 2022-08-19 RX ADMIN — QUETIAPINE FUMARATE 12.5 MILLIGRAM(S): 200 TABLET, FILM COATED ORAL at 21:27

## 2022-08-19 RX ADMIN — Medication 650 MILLIGRAM(S): at 21:27

## 2022-08-19 RX ADMIN — Medication 650 MILLIGRAM(S): at 23:46

## 2022-08-19 RX ADMIN — Medication 1 TABLET(S): at 12:05

## 2022-08-19 NOTE — CONSULT NOTE ADULT - ASSESSMENT
86 yo F with no significant PMH who is s/p mechanical fall 8/9. Found to have multiple chronic appearing compression fractures. Severe collapse of L1 with mild bony retropulsion.CT pelvis: acute minimally displaced right pubic body fx with extension to Rt inferior pubic ramus. Acute minimally displaced comminuted fx of right puboacetabular junction. moderate age indeterminate compression fx of L4, worse. 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 Now admitted to [facility] rehab for functional decline.     # Acute R pelvic fracture, multiple lumbar compression fractures, severe collapse of L1 with mild bony retropulsion  - PT/OT per PMR  - Pain management per PMR    #Confusion   - no s/s of infection  - agree with low dose Seroquel qhs      #incidental aneurysmal dilation of bilateral common iliac arteries.   - CT pelvis showed 1.7cm aneurysmal dilation of bilateral common iliac arteries.   - follow up vascular surgery as outpt    DVT Prophylaxis:  - Lovenox     Thank you for involving us in the care of this patient. Medicine service will follow.

## 2022-08-19 NOTE — PROVIDER CONTACT NOTE (OTHER) - ACTION/TREATMENT ORDERED:
Seroquel and other meds administered as ordered, 4: 1 enhanced supervision maintained, close monitoring in progress. Dr. Crews made aware, no orders at this time

## 2022-08-19 NOTE — DIETITIAN INITIAL EVALUATION ADULT - ENERGY INTAKE
Patient w/ Cognitive Impairment/Confused   Patient Unable to Provide Reliable Information & No Family/Caregivers @Bedside  Patient w/ Cognitive Impairment/Confused   Patient Unable to Provide Reliable Information & No Family/Caregivers @Bedside     Will Monitor Intake

## 2022-08-19 NOTE — DIETITIAN INITIAL EVALUATION ADULT - PERTINENT MEDS FT
MEDICATIONS  (STANDING):  enoxaparin Injectable 40 milliGRAM(s) SubCutaneous every 24 hours  multivitamin 1 Tablet(s) Oral daily  QUEtiapine 12.5 milliGRAM(s) Oral at bedtime    MEDICATIONS  (PRN):  acetaminophen     Tablet .. 650 milliGRAM(s) Oral every 6 hours PRN Temp greater or equal to 38C (100.4F), Mild Pain (1 - 3)  aluminum hydroxide/magnesium hydroxide/simethicone Suspension 30 milliLiter(s) Oral every 4 hours PRN Dyspepsia  melatonin 3 milliGRAM(s) Oral at bedtime PRN Insomnia  senna 2 Tablet(s) Oral at bedtime PRN Constipation  traMADol 50 milliGRAM(s) Oral every 6 hours PRN Moderate Pain (4 - 6)

## 2022-08-19 NOTE — DIETITIAN INITIAL EVALUATION ADULT - ADD RECOMMEND
1) Monitor Weights, Intake, Tolerance, Skin, POCT & Labwork  2) Ensure Enlive 8oz PO BID  3) Continue Nutrition Plan of Care

## 2022-08-19 NOTE — DIETITIAN INITIAL EVALUATION ADULT - PERTINENT LABORATORY DATA
08-19    136  |  99  |  19  ----------------------------<  103<H>  3.9   |  27  |  0.94    Ca    9.6      19 Aug 2022 06:55    TPro  7.9  /  Alb  3.6  /  TBili  0.5  /  DBili  x   /  AST  20  /  ALT  16  /  AlkPhos  107  08-19

## 2022-08-19 NOTE — DIETITIAN INITIAL EVALUATION ADULT - NS FNS DIET ORDER
on Regular Diet (IDDSI Level 7) w/ Thin Liquids (IDDSI Level 0)   Recommend Initiate Ensure Enlive 8oz PO BID (Provides 700kcal & 40grams of Protein)

## 2022-08-19 NOTE — CONSULT NOTE ADULT - SUBJECTIVE AND OBJECTIVE BOX
Patient is a 87y old  Female who presents with a chief complaint of Lumbar compression fracture and pelvic fx (18 Aug 2022 16:20)    HPI, per admission H&P:  This is a 86 YO female with no significant past medical history s/p mechanical fall on 8/9. Patient  was seen in ED. ct head and c-spine negative at that time. x-ray pelvis also neg. pt was discharged home and since then c/o worsening pain in the right hip when putting weight on it. Pt usually ambulates without any assistive device prior to the fall. Pt reports there is no pain at rest but pain usually worse with weight bearing and now having difficulty with ambulation. Pain usually relieves with tylenol. Pt admits to intermittent lower back pain but usually triggers by prolonged sitting. Pt denies numbness, tingling, swelling, fever, chills, n/v, abd pain, dysuria, urinary or bowel changes.    CT head today showed no acute intracranial trauma. Right frontal scalp soft tissue swelling. CT lumbar showed multiple chronic appearing compression fractures. Severe collapse of L1 with mild bony retropulsion  CT pelvis: acute minimally displaced right pubic body fx with extension to Rt inferior pubic ramus. Acute minimally displaced comminuted fx of right puboacetabular junction. moderate age indeterminate compression fx of L4, worse when compared to prior study. 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 No surgical intervention. OP vasculat f/u.     Patient was evaluated by PM&R and therapy for functional deficits, gait/ADL impairments and acute rehabilitation was recommended. Patient was medically optimized for discharge to [facility] IRU on 8/18/22. (18 Aug 2022 16:20)    Subjective   Patient seen and examined at bedside. Patient confused overnight per staff. Denies pain. Answering simple questions appropriately.     PAST MEDICAL & SURGICAL HISTORY:  No pertinent past medical history    SOCIAL HISTORY: No smoking, alcohol or drug use  FAMILY HISTORY:  Non-contributory     ALLERGIES:  No Known Allergies    MEDICATIONS  (STANDING):  enoxaparin Injectable 40 milliGRAM(s) SubCutaneous every 24 hours  multivitamin 1 Tablet(s) Oral daily  QUEtiapine 12.5 milliGRAM(s) Oral at bedtime    MEDICATIONS  (PRN):  acetaminophen     Tablet .. 650 milliGRAM(s) Oral every 6 hours PRN Temp greater or equal to 38C (100.4F), Mild Pain (1 - 3)  aluminum hydroxide/magnesium hydroxide/simethicone Suspension 30 milliLiter(s) Oral every 4 hours PRN Dyspepsia  melatonin 3 milliGRAM(s) Oral at bedtime PRN Insomnia  senna 2 Tablet(s) Oral at bedtime PRN Constipation  traMADol 50 milliGRAM(s) Oral every 6 hours PRN Moderate Pain (4 - 6)    Review of Systems: Refer to HPI for pertinent positives and negatives. All other ROS reviewed and negative except as otherwise stated above.    Vital Signs Last 24 Hrs  T(F): 98 (19 Aug 2022 08:27), Max: 98.7 (18 Aug 2022 19:57)  HR: 85 (19 Aug 2022 08:27) (83 - 91)  BP: 135/79 (19 Aug 2022 08:27) (100/66 - 141/90)  RR: 16 (19 Aug 2022 08:27) (15 - 16)  SpO2: 98% (19 Aug 2022 08:27) (96% - 100%)  I&O's Summary    PHYSICAL EXAM:  GENERAL: NAD, well-groomed. thin elderly F  HEAD:  Atraumatic, Normocephalic  EYES: EOMI, PERRL, conjunctiva and sclera clear  ENMT: Moist mucous membranes, Good dentition  NECK: Supple, No JVD  CHEST/LUNG: Clear to auscultation bilaterally, non-labored breathing, good air entry  HEART: RRR; S1/S2, No murmur  ABDOMEN: Soft, Nontender, Nondistended; Bowel sounds present  VASCULAR: Normal pulses, Normal capillary refill  EXTREMITIES: No cyanosis, No edema  LYMPH: No lymphadenopathy noted  SKIN: Warm, Intact  PSYCH: Normal mood and affect  NERVOUS SYSTEM:  A/O x2,  No focal deficits, moves all extremities    LABS:                        11.8   7.62  )-----------( 218      ( 19 Aug 2022 06:55 )             35.9     08-19    136  |  99  |  19  ----------------------------<  103  3.9   |  27  |  0.94    Ca    9.6      19 Aug 2022 06:55    TPro  7.9  /  Alb  3.6  /  TBili  0.5  /  DBili  x   /  AST  20  /  ALT  16  /  AlkPhos  107  08-19    COVID-19 PCR: NotDetec (08-17-22 @ 17:10)  COVID-19 PCR: NotDetec (08-08-22 @ 20:54)    RADIOLOGY & ADDITIONAL TESTS:    8/17 Chest X-Ray (personally reviewed by me): +scoliosis, no focal consolidation. left hemidiaphragm elevation   8/17 EKG (personally reviewed by me): Sinus rhythm at 80bpm    Care Discussed with Consultants/Other Providers: d/w Dr. Snowden, bedside RN

## 2022-08-19 NOTE — DIETITIAN INITIAL EVALUATION ADULT - OTHER INFO
Initial Nutrition Assessment   87yr Old Female   No Food Allergy/Intolerance - Per EMR  Tolerates Diet Well - No Chewing/Swallowing Complications (Per Patient)  No Pressure Ulcers (as Per Nursing Flow Sheets)  No Edema Noted (as Per Nursing Flow Sheets) Initial Nutrition Assessment   87yr Old Female   No Food Allergy/Intolerance - Per EMR  Tolerates Diet Well - No Chewing/Swallowing Complications (Per Patient Observation)   No Pressure Ulcers (as Per Nursing Flow Sheets)  No Edema Noted (as Per Nursing Flow Sheets)

## 2022-08-19 NOTE — DIETITIAN INITIAL EVALUATION ADULT - ORAL INTAKE PTA/DIET HISTORY
Patient w/ Cognitive Impairment/Confused   Patient Unable to Provide Reliable Information & No Family/Caregivers @Bedside

## 2022-08-20 PROCEDURE — 99231 SBSQ HOSP IP/OBS SF/LOW 25: CPT | Mod: GC

## 2022-08-20 PROCEDURE — 99232 SBSQ HOSP IP/OBS MODERATE 35: CPT

## 2022-08-20 RX ADMIN — QUETIAPINE FUMARATE 12.5 MILLIGRAM(S): 200 TABLET, FILM COATED ORAL at 21:18

## 2022-08-20 RX ADMIN — Medication 3 MILLIGRAM(S): at 21:18

## 2022-08-20 RX ADMIN — Medication 1 TABLET(S): at 11:29

## 2022-08-20 RX ADMIN — Medication 650 MILLIGRAM(S): at 21:58

## 2022-08-20 RX ADMIN — ENOXAPARIN SODIUM 40 MILLIGRAM(S): 100 INJECTION SUBCUTANEOUS at 11:28

## 2022-08-20 RX ADMIN — SENNA PLUS 2 TABLET(S): 8.6 TABLET ORAL at 21:18

## 2022-08-20 RX ADMIN — Medication 650 MILLIGRAM(S): at 21:18

## 2022-08-20 NOTE — PROGRESS NOTE ADULT - ASSESSMENT
86 yo F with no significant PMH who is s/p mechanical fall 8/9. Found to have multiple chronic appearing compression fractures. Severe collapse of L1 with mild bony retropulsion.CT pelvis: acute minimally displaced right pubic body fx with extension to Rt inferior pubic ramus. Acute minimally displaced comminuted fx of right puboacetabular junction. moderate age indeterminate compression fx of L4, worse. 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 Now admitted to [facility] rehab for functional decline.     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   - CT pelvis showed 1.7cm aneurysmal dilation of bilateral common iliac arteries.   - follow up vascular surgery as outpt    DVT Prophylaxis:  - Lovenox     Thank you for involving us in the care of this patient. Medicine service will follow.

## 2022-08-20 NOTE — PROGRESS NOTE ADULT - SUBJECTIVE AND OBJECTIVE BOX
Patient is a 87y old  Female who presents with a chief complaint of Unspecified fracture of unspecified lumbar vertebra, initial encounter for closed fracture (19 Aug 2022 12:41)    Patient seen and examined at bedside. No acute overnight events. More talkative this morning, sitting comfortably.     ALLERGIES:  No Known Allergies    MEDICATIONS  (STANDING):  enoxaparin Injectable 40 milliGRAM(s) SubCutaneous every 24 hours  multivitamin 1 Tablet(s) Oral daily  QUEtiapine 12.5 milliGRAM(s) Oral at bedtime    MEDICATIONS  (PRN):  acetaminophen     Tablet .. 650 milliGRAM(s) Oral every 6 hours PRN Temp greater or equal to 38C (100.4F), Mild Pain (1 - 3)  aluminum hydroxide/magnesium hydroxide/simethicone Suspension 30 milliLiter(s) Oral every 4 hours PRN Dyspepsia  melatonin 3 milliGRAM(s) Oral at bedtime PRN Insomnia  senna 2 Tablet(s) Oral at bedtime PRN Constipation  traMADol 50 milliGRAM(s) Oral every 6 hours PRN Moderate Pain (4 - 6)    Vital Signs Last 24 Hrs  T(F): 98.6 (19 Aug 2022 21:32), Max: 98.6 (19 Aug 2022 21:32)  HR: 97 (19 Aug 2022 21:32) (97 - 97)  BP: 119/79 (19 Aug 2022 21:32) (119/79 - 119/79)  RR: 16 (19 Aug 2022 21:32) (16 - 16)  SpO2: 97% (19 Aug 2022 21:32) (97% - 97%)  I&O's Summary    BMI (kg/m2): 23.6 (08-18-22 @ 18:00), 22.1 (08-17-22 @ 12:27)    PHYSICAL EXAM:  General: NAD, Awake, alert   ENT: MMM, no tonsilar exudate  Neck: Supple, No JVD  Lungs: Clear to auscultation bilaterally, no wheezes. Good air entry bilaterally   Cardio: RRR, S1/S2, No murmurs  Abdomen: Soft, Nontender, Nondistended; Bowel sounds present  Extremities: No calf tenderness, No pitting edema    LABS:                        11.8   7.62  )-----------( 218      ( 19 Aug 2022 06:55 )             35.9       08-19    136  |  99  |  19  ----------------------------<  103  3.9   |  27  |  0.94    Ca    9.6      19 Aug 2022 06:55    TPro  7.9  /  Alb  3.6  /  TBili  0.5  /  DBili  x   /  AST  20  /  ALT  16  /  AlkPhos  107  08-19     COVID-19 PCR: NotDetec (08-17-22 @ 17:10)  COVID-19 PCR: NotDetec (08-08-22 @ 20:54)    RADIOLOGY & ADDITIONAL TESTS:     Care Discussed with Consultants/Other Providers:

## 2022-08-20 NOTE — CHART NOTE - NSCHARTNOTEFT_GEN_A_CORE
[facility] Inter[facility]plinary Plan of Care    REHABILITATION DIAGNOSIS:  Multiple fractures of pelvis without disruption of pelvic ring, initial encounter for closed fracture          COMORBIDITIES/COMPLICATING CONDITIONS IMPACTING REHABILITATION:  HEALTH ISSUES - PROBLEM Dx:  multiple compression fractures thoracic and lumbar spine  cognitive deficit      PAST MEDICAL & SURGICAL HISTORY:  No pertinent past medical history          Based upon consideration of the patient's impairments, functional status, complicating conditions and any other contributing factors and after information garnered from the assessments of all therapy disciplines involved in treating the patient and other pertinent clinicians:    INTERDISCIPLINARY REHABILITATION INTERVENTIONS:    [ X  ] Transfer Training  [ X  ] Bed Mobility  [ X  ] Therapeutic Exercise  [ X ] Balance/Coordination Exercises  [ X ] Locomotion retraining  [ X  ] Stairs  [  X ] Functional Transfer Training  [x   ] Bowel/Bladder program  [ x  ] Pain Management  [ x  ] Skin/Wound Care  [   ] Visual/Perceptual Training  [   ] Therapeutic Recreation Activities  [   ] Neuromuscular Re-education  [ X  ] Activities of Daily Living  [   ] Speech Exercise  [   ] Swallowing Exercises  [   ] Vital Stim  [   ] Dietary Supplements  [   ] Calorie Count  [   ] Cognitive Exercises  [   ] Congnitive/Linguistic Treatment  [   ] Behavior Program  [   ] Neuropsych Therapy  [ X  ] Patient/Family Counseling  [ X ] Family Training  [ X  ] Community Re-entry  [   ] Orthotic Evaluation  [   ] Prosthetic Eval/Training    MEDICAL PROGNOSIS:  good    REHAB POTENTIAL:  good  EXPECTED DAILY THERAPY:         PT:2hr       OT:1hr       ST:       P&O:    EXPECTED INTENSITY OF PROGRAM:  3 hrs / Day    EXPECTED FREQUENCY OF PROGRAM: 5 Days/ Week    ESTIMATED LOS:  [  ] 5-7 Days  [  ] 7-10 Days  [x  ] 10- 14 Days  [  ] 14- 18 Days  [  ] 18- 21 Days    ESTIMATED DISPOSITION:  [  ] Home   [  ] Home with Outpatient Therapies  [ x ] Home with Home Therapies  [  ] Assisted Living  [  ] Nursing Home  [  ] Long Term Acute Care    INTERDISCIPLINARY FUNCTIONAL OUTCOMES/GOALS:         Gait/Mobility:6       Transfers:6       ADLs:6       Functional Transfers:6       Medication Management:7       Communication:NA       Cognitive:6       Dysphagia:NA       Bladder7       Bowel:7     Functional Independent Measures:   7 = Independent  6 = Modified Independent  5 = Supervision  4 = Minimal Assist/ Contact Guard  3 = Moderate Assistance  2 = Maximum Assistance  1 = Total Assistance  0 = Unable to assess

## 2022-08-20 NOTE — PROGRESS NOTE ADULT - SUBJECTIVE AND OBJECTIVE BOX
Cc: Gait dysfunction    HPI: Patient with no new medical issues today.  Slept Better with seroquel  no back pain- right Groin better somewhat improved  Pain controlled, no chest pain, no N/V, no Fevers/Chills. No other new ROS  Has been tolerating rehabilitation program.    MEDICATIONS  (STANDING):  enoxaparin Injectable 40 milliGRAM(s) SubCutaneous every 24 hours  multivitamin 1 Tablet(s) Oral daily  QUEtiapine 12.5 milliGRAM(s) Oral at bedtime    MEDICATIONS  (PRN):  acetaminophen     Tablet .. 650 milliGRAM(s) Oral every 6 hours PRN Temp greater or equal to 38C (100.4F), Mild Pain (1 - 3)  aluminum hydroxide/magnesium hydroxide/simethicone Suspension 30 milliLiter(s) Oral every 4 hours PRN Dyspepsia  melatonin 3 milliGRAM(s) Oral at bedtime PRN Insomnia  senna 2 Tablet(s) Oral at bedtime PRN Constipation  traMADol 50 milliGRAM(s) Oral every 6 hours PRN Moderate Pain (4 - 6)      Vital Signs Last 24 Hrs  T(C): 36.9 (20 Aug 2022 11:03), Max: 37 (19 Aug 2022 21:32)  T(F): 98.5 (20 Aug 2022 11:03), Max: 98.6 (19 Aug 2022 21:32)  HR: 90 (20 Aug 2022 11:03) (90 - 97)  BP: 137/80 (20 Aug 2022 11:03) (119/79 - 137/80)  BP(mean): --  RR: 16 (20 Aug 2022 11:03) (16 - 16)  SpO2: 97% (20 Aug 2022 11:03) (97% - 97%)    Parameters below as of 20 Aug 2022 11:03  Patient On (Oxygen Delivery Method): room air        In NAD  HEENT- EOMI  Heart- RRR, S1S2  Lungs- CTA bl.  Abd- + BS, NT  Ext- No calf pain  Neuro- Exam unchanged  RLE SLR still 2/5- Active heel glide in bed no pain                          11.8   7.62  )-----------( 218      ( 19 Aug 2022 06:55 )             35.9     08-19    136  |  99  |  19  ----------------------------<  103<H>  3.9   |  27  |  0.94    Ca    9.6      19 Aug 2022 06:55    TPro  7.9  /  Alb  3.6  /  TBili  0.5  /  DBili  x   /  AST  20  /  ALT  16  /  AlkPhos  107  08-19    CAPILLARY BLOOD GLUCOSE                    Imp: Patient with diagnosis of gait ab secondary to Right pelivic Fx, Multiple TL compression FX  admitted for comprehensive acute rehabilitation.  Plan:  - Continue therapies  - DVT prophylaxis-Lovenox  - Skin- Turn q2h, check skin daily  - Continue current medications; patient medically stable.   - Patient is stable to continue current rehabilitation program.

## 2022-08-21 PROCEDURE — 99232 SBSQ HOSP IP/OBS MODERATE 35: CPT | Mod: GC

## 2022-08-21 PROCEDURE — 99232 SBSQ HOSP IP/OBS MODERATE 35: CPT

## 2022-08-21 RX ADMIN — Medication 3 MILLIGRAM(S): at 21:43

## 2022-08-21 RX ADMIN — Medication 650 MILLIGRAM(S): at 21:43

## 2022-08-21 RX ADMIN — Medication 650 MILLIGRAM(S): at 22:18

## 2022-08-21 RX ADMIN — Medication 1 TABLET(S): at 11:30

## 2022-08-21 RX ADMIN — SENNA PLUS 2 TABLET(S): 8.6 TABLET ORAL at 21:43

## 2022-08-21 RX ADMIN — QUETIAPINE FUMARATE 12.5 MILLIGRAM(S): 200 TABLET, FILM COATED ORAL at 21:43

## 2022-08-21 RX ADMIN — ENOXAPARIN SODIUM 40 MILLIGRAM(S): 100 INJECTION SUBCUTANEOUS at 11:30

## 2022-08-21 NOTE — PROGRESS NOTE ADULT - SUBJECTIVE AND OBJECTIVE BOX
Patient is a 87y old  Female who presents with a chief complaint of Lumbar compression fracture and pelvic fx (20 Aug 2022 17:03)    Patient seen and examined at bedside. No acute overnight events. Denies pain.     ALLERGIES:  No Known Allergies    MEDICATIONS  (STANDING):  enoxaparin Injectable 40 milliGRAM(s) SubCutaneous every 24 hours  multivitamin 1 Tablet(s) Oral daily  QUEtiapine 12.5 milliGRAM(s) Oral at bedtime    MEDICATIONS  (PRN):  acetaminophen     Tablet .. 650 milliGRAM(s) Oral every 6 hours PRN Temp greater or equal to 38C (100.4F), Mild Pain (1 - 3)  aluminum hydroxide/magnesium hydroxide/simethicone Suspension 30 milliLiter(s) Oral every 4 hours PRN Dyspepsia  melatonin 3 milliGRAM(s) Oral at bedtime PRN Insomnia  senna 2 Tablet(s) Oral at bedtime PRN Constipation  traMADol 50 milliGRAM(s) Oral every 6 hours PRN Moderate Pain (4 - 6)    Vital Signs Last 24 Hrs  T(F): 98.3 (21 Aug 2022 09:14), Max: 98.5 (20 Aug 2022 11:03)  HR: 92 (21 Aug 2022 09:14) (90 - 97)  BP: 127/64 (21 Aug 2022 09:14) (100/63 - 137/80)  RR: 16 (21 Aug 2022 09:14) (16 - 16)  SpO2: 97% (21 Aug 2022 09:14) (96% - 97%)  I&O's Summary    BMI (kg/m2): 23.6 (08-18-22 @ 18:00), 22.1 (08-17-22 @ 12:27)    PHYSICAL EXAM:  General: NAD, Awake, alert. pleasant   ENT: MMM, no tonsilar exudate  Neck: Supple, No JVD  Lungs: Clear to auscultation bilaterally, no wheezes. Good air entry bilaterally   Cardio: RRR, S1/S2, No murmurs  Abdomen: Soft, Nontender, Nondistended; Bowel sounds present  Extremities: No calf tenderness, No pitting edema    LABS:                        11.8   7.62  )-----------( 218      ( 19 Aug 2022 06:55 )             35.9       08-19    136  |  99  |  19  ----------------------------<  103  3.9   |  27  |  0.94    Ca    9.6      19 Aug 2022 06:55    TPro  7.9  /  Alb  3.6  /  TBili  0.5  /  DBili  x   /  AST  20  /  ALT  16  /  AlkPhos  107  08-19     COVID-19 PCR: Massiel (08-17-22 @ 17:10)  COVID-19 PCR: Massiel (08-08-22 @ 20:54)    RADIOLOGY & ADDITIONAL TESTS:     Care Discussed with Consultants/Other Providers:

## 2022-08-21 NOTE — PROGRESS NOTE ADULT - SUBJECTIVE AND OBJECTIVE BOX
chief complaint- Right groin/RLE pain with difficulty ambulating    This is a 86 YO female with no significant past medical history s/p mechanical fall on 8/9. Patient  was seen in ED. ct head and c-spine negative at that time. x-ray pelvis also neg. pt was discharged home and since then c/o worsening pain in the right hip when putting weight on it. Pt usually ambulates without any assistive device prior to the fall. Pt reports there is no pain at rest but pain usually worse with weight bearing and now having difficulty with ambulation. Pain usually relieves with tylenol. Pt admits to intermittent lower back pain but usually triggers by prolonged sitting. Pt denies numbness, tingling, swelling, fever, chills, n/v, abd pain, dysuria, urinary or bowel changes.    CT head today showed no acute intracranial trauma. Right frontal scalp soft tissue swelling. CT lumbar showed multiple chronic appearing compression fractures. Severe collapse of L1 with mild bony retropulsion  CT pelvis: acute minimally displaced right pubic body fx with extension to Rt inferior pubic ramus. Acute minimally displaced comminuted fx of right puboacetabular junction. moderate age indeterminate compression fx of L4, worse when compared to prior study. 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 No surgical intervention. OP vasculat f/u.     Patient was evaluated by PM&R and therapy for functional deficits, gait/ADL impairments and acute rehabilitation was recommended. Patient was medically optimized for discharge to [facility] IRU on 8/18/22.      ROS/subjective  Some improvement in right groin pain  no back pain  slept well according to nursing with current dose Seroquel  no dizziness, no headaches  no nausea, no vomiting  no SOB, No chest pain  last BM 8/19- voiding and reportedly continent    MEDICATIONS  (STANDING):  enoxaparin Injectable 40 milliGRAM(s) SubCutaneous every 24 hours  multivitamin 1 Tablet(s) Oral daily  QUEtiapine 12.5 milliGRAM(s) Oral at bedtime    MEDICATIONS  (PRN):  acetaminophen     Tablet .. 650 milliGRAM(s) Oral every 6 hours PRN Temp greater or equal to 38C (100.4F), Mild Pain (1 - 3)  aluminum hydroxide/magnesium hydroxide/simethicone Suspension 30 milliLiter(s) Oral every 4 hours PRN Dyspepsia  melatonin 3 milliGRAM(s) Oral at bedtime PRN Insomnia  senna 2 Tablet(s) Oral at bedtime PRN Constipation  traMADol 50 milliGRAM(s) Oral every 6 hours PRN Moderate Pain (4 - 6)                  CAPILLARY BLOOD GLUCOSE          Vital Signs Last 24 Hrs  T(C): 36.8 (21 Aug 2022 09:14), Max: 36.8 (21 Aug 2022 09:14)  T(F): 98.3 (21 Aug 2022 09:14), Max: 98.3 (21 Aug 2022 09:14)  HR: 92 (21 Aug 2022 09:14) (92 - 97)  BP: 127/64 (21 Aug 2022 09:14) (100/63 - 127/64)  BP(mean): --  RR: 16 (21 Aug 2022 09:14) (16 - 16)  SpO2: 97% (21 Aug 2022 09:14) (96% - 97%)    Parameters below as of 21 Aug 2022 09:14  Patient On (Oxygen Delivery Method): room air    Gen - NAD, Comfortable  HEENT - EOMI, MMM, right frontal scalp edema   Neck - Supple, No limited ROM  Pulm - CTAB, No wheeze, No rhonchi, No crackles  Cardiovascular - RRR, S1S2, No murmurs  Abdomen - Soft, NT/ND, +BS  Extremities - No clubbing, no cyanosis, no peripheral edema, no calf tenderness  Neuro-     Cognitive - Awake, Alert, Oriented to self, place- not time     Communication - Fluent     Attention: Intact      Memory: able to identify object, able to recall independently immediately, she needed cues to recall 2 objects 3 min later        Cranial Nerves - CN 2-12 intact     Motor -                     LEFT    UE - ShAB 5/5, EF 5/5, EE 5/5,  5/5                    RIGHT UE - ShAB 5/5, EF 5/5, EE 5/5,   5/5                    LEFT    LE - HF 4/5, KE 4/5, DF 5/5, PF 5/5                    RIGHT LE - HF 2/5 in chair- Limited by pain, good active heel glide in bed minimal pain KE 4/5, DF 5/5, PF 5/5        Sensory - Intact to LT     Reflexes - DTR Intact, No primitive reflexive     Coordination - FTN intact     Tone - normal  Psychiatric - Mood stable, Affect WNL  MSK: Kyphosis- no localized back pain on palpation  Skin:  all skin intact    Rehab eval in progress

## 2022-08-21 NOTE — PROGRESS NOTE ADULT - ASSESSMENT
88 yo F with no significant PMH who is s/p mechanical fall 8/9. Found to have multiple chronic appearing compression fractures. Severe collapse of L1 with mild bony retropulsion.CT pelvis: acute minimally displaced right pubic body fx with extension to Rt inferior pubic ramus. Acute minimally displaced comminuted fx of right puboacetabular junction. moderate age indeterminate compression fx of L4, worse. 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 Now admitted to [facility] rehab for functional decline.     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   - CT pelvis showed 1.7cm aneurysmal dilation of bilateral common iliac arteries.   - follow up vascular surgery as outpt    DVT Prophylaxis:  - Lovenox

## 2022-08-21 NOTE — PROGRESS NOTE ADULT - ASSESSMENT
This is a 88 YO female with no significant past medical history s/p mechanical fall on 8/9. CT lumbar showed multiple chronic appearing compression fractures. Severe collapse of L1 with mild bony retropulsion.CT pelvis: acute minimally displaced right pubic body fx with extension to Rt inferior pubic ramus. Acute minimally displaced comminuted fx of right puboacetabular junction. moderate age indeterminate compression fx of L4, worse. 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 Patient now with gait Instability, ADL impairments and Functional impairments.    #Acute right pelvic fx  #Multiple lumbar compression fx. Severe collapse of L1 with mild bony retropulsion  - Comprehensive Rehab Program: PT/OT, 3hours daily and 5 days weekly  - PT: Focused on improving strength, endurance, coordination, balance, functional mobility, and transfers  - OT: Focused on improving strength, fine motor skills, coordination, posture and ADLs.    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   - CT pelvis showed 1.7cm aneurysmal dilation of bilateral common iliac arteries.   - follow up vascular surgery as outpt    Poor sleep-initially sundowning  better5 now on low dose Seroquel HS    #Dyspepsia  - Maalox    #Pain management  - Tylenol PRN, Tramadol PRN    #DVT ppx  - Lovenox, SCD, TEDs    #Bowel Regimen  - Senna    #Bladder management  - BS on admission, and q 8 hours (SC if > 400)  - Monitor UO    #FEN   - Diet: Regular  - Supplements: MVI    #Skin:  - No active issues at this time  - Pressure injury/Skin: Turn Q2hrs while in bed, OOB to Chair, PT/OT     #Precaution  - Fall

## 2022-08-22 LAB
ALBUMIN SERPL ELPH-MCNC: 3.1 G/DL — LOW (ref 3.3–5)
ALP SERPL-CCNC: 104 U/L — SIGNIFICANT CHANGE UP (ref 40–120)
ALT FLD-CCNC: 14 U/L — SIGNIFICANT CHANGE UP (ref 10–45)
ANION GAP SERPL CALC-SCNC: 10 MMOL/L — SIGNIFICANT CHANGE UP (ref 5–17)
AST SERPL-CCNC: 13 U/L — SIGNIFICANT CHANGE UP (ref 10–40)
BILIRUB SERPL-MCNC: 0.4 MG/DL — SIGNIFICANT CHANGE UP (ref 0.2–1.2)
BUN SERPL-MCNC: 19 MG/DL — SIGNIFICANT CHANGE UP (ref 7–23)
CALCIUM SERPL-MCNC: 9.2 MG/DL — SIGNIFICANT CHANGE UP (ref 8.4–10.5)
CHLORIDE SERPL-SCNC: 101 MMOL/L — SIGNIFICANT CHANGE UP (ref 96–108)
CO2 SERPL-SCNC: 27 MMOL/L — SIGNIFICANT CHANGE UP (ref 22–31)
CREAT SERPL-MCNC: 0.86 MG/DL — SIGNIFICANT CHANGE UP (ref 0.5–1.3)
EGFR: 67 ML/MIN/1.73M2 — SIGNIFICANT CHANGE UP
GLUCOSE SERPL-MCNC: 100 MG/DL — HIGH (ref 70–99)
HCT VFR BLD CALC: 33.3 % — LOW (ref 34.5–45)
HGB BLD-MCNC: 10.8 G/DL — LOW (ref 11.5–15.5)
MCHC RBC-ENTMCNC: 30.3 PG — SIGNIFICANT CHANGE UP (ref 27–34)
MCHC RBC-ENTMCNC: 32.4 GM/DL — SIGNIFICANT CHANGE UP (ref 32–36)
MCV RBC AUTO: 93.5 FL — SIGNIFICANT CHANGE UP (ref 80–100)
NRBC # BLD: 0 /100 WBCS — SIGNIFICANT CHANGE UP (ref 0–0)
PLATELET # BLD AUTO: 208 K/UL — SIGNIFICANT CHANGE UP (ref 150–400)
POTASSIUM SERPL-MCNC: 3.6 MMOL/L — SIGNIFICANT CHANGE UP (ref 3.5–5.3)
POTASSIUM SERPL-SCNC: 3.6 MMOL/L — SIGNIFICANT CHANGE UP (ref 3.5–5.3)
PROT SERPL-MCNC: 7.2 G/DL — SIGNIFICANT CHANGE UP (ref 6–8.3)
RBC # BLD: 3.56 M/UL — LOW (ref 3.8–5.2)
RBC # FLD: 13.2 % — SIGNIFICANT CHANGE UP (ref 10.3–14.5)
SODIUM SERPL-SCNC: 138 MMOL/L — SIGNIFICANT CHANGE UP (ref 135–145)
WBC # BLD: 6.27 K/UL — SIGNIFICANT CHANGE UP (ref 3.8–10.5)
WBC # FLD AUTO: 6.27 K/UL — SIGNIFICANT CHANGE UP (ref 3.8–10.5)

## 2022-08-22 PROCEDURE — 99232 SBSQ HOSP IP/OBS MODERATE 35: CPT

## 2022-08-22 PROCEDURE — 99232 SBSQ HOSP IP/OBS MODERATE 35: CPT | Mod: GC

## 2022-08-22 RX ORDER — QUETIAPINE FUMARATE 200 MG/1
25 TABLET, FILM COATED ORAL
Refills: 0 | Status: DISCONTINUED | OUTPATIENT
Start: 2022-08-22 | End: 2022-08-27

## 2022-08-22 RX ADMIN — QUETIAPINE FUMARATE 25 MILLIGRAM(S): 200 TABLET, FILM COATED ORAL at 21:25

## 2022-08-22 RX ADMIN — Medication 1 TABLET(S): at 11:22

## 2022-08-22 NOTE — PROGRESS NOTE ADULT - SUBJECTIVE AND OBJECTIVE BOX
Patient is a 87y old  Female who presents with a chief complaint of Lumbar compression fracture and pelvic fx (21 Aug 2022 14:23)      Patient seen and examined at bedside.  No events overnight. Patient denies any acute complaints at this time, feeling better today. Denies headache, changes in vision, chest pain or SOB.    ALLERGIES:  No Known Allergies    MEDICATIONS  (STANDING):  enoxaparin Injectable 40 milliGRAM(s) SubCutaneous every 24 hours  multivitamin 1 Tablet(s) Oral daily  QUEtiapine 12.5 milliGRAM(s) Oral at bedtime    MEDICATIONS  (PRN):  acetaminophen     Tablet .. 650 milliGRAM(s) Oral every 6 hours PRN Temp greater or equal to 38C (100.4F), Mild Pain (1 - 3)  aluminum hydroxide/magnesium hydroxide/simethicone Suspension 30 milliLiter(s) Oral every 4 hours PRN Dyspepsia  melatonin 3 milliGRAM(s) Oral at bedtime PRN Insomnia  senna 2 Tablet(s) Oral at bedtime PRN Constipation  traMADol 50 milliGRAM(s) Oral every 6 hours PRN Moderate Pain (4 - 6)    Vital Signs Last 24 Hrs  T(F): 97.9 (22 Aug 2022 08:24), Max: 98.8 (21 Aug 2022 21:50)  HR: 78 (22 Aug 2022 08:24) (78 - 91)  BP: 132/84 (22 Aug 2022 08:24) (124/84 - 132/84)  RR: 16 (22 Aug 2022 08:24) (16 - 16)  SpO2: 99% (22 Aug 2022 08:24) (99% - 99%)  I&O's Summary      PHYSICAL EXAM:  General: NAD, Awake, alert, pleasant, laying in bed  ENT: MMM, no tonsilar exudate  Neck: Supple, No JVD  Lungs: Clear to auscultation bilaterally, no wheezes. Good air entry bilaterally   Cardio: RRR, S1/S2, No murmurs  Abdomen: Soft, Nontender, Nondistended; Bowel sounds present  Extremities: No calf tenderness, No pitting edema    LABS:                        10.8   6.27  )-----------( 208      ( 22 Aug 2022 07:00 )             33.3     08-22    138  |  101  |  19  ----------------------------<  100  3.6   |  27  |  0.86    Ca    9.2      22 Aug 2022 07:00    TPro  7.2  /  Alb  3.1  /  TBili  0.4  /  DBili  x   /  AST  13  /  ALT  14  /  AlkPhos  104  08-22                                    COVID-19 PCR: NotDetec (08-17-22 @ 17:10)  COVID-19 PCR: NotDetec (08-08-22 @ 20:54)      RADIOLOGY & ADDITIONAL TESTS: reviewed labs    Care Discussed with Consultants/Other Providers: discussed with Dr. Snowden

## 2022-08-22 NOTE — PROGRESS NOTE ADULT - ASSESSMENT
86 yo F with no significant PMH who is s/p mechanical fall 8/9. Found to have multiple chronic appearing compression fractures. Severe collapse of L1 with mild bony retropulsion.CT pelvis: acute minimally displaced right pubic body fx with extension to Rt inferior pubic ramus. Acute minimally displaced comminuted fx of right puboacetabular junction. moderate age indeterminate compression fx of L4, worse. 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 Now admitted to [facility] rehab for functional decline.     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   - CT pelvis showed 1.7cm aneurysmal dilation of bilateral common iliac arteries.   - follow up vascular surgery as outpt    #mood disorder  - seroquel 12.5mg qhs    #DVT Prophylaxis:  - Lovenox

## 2022-08-22 NOTE — PROGRESS NOTE ADULT - SUBJECTIVE AND OBJECTIVE BOX
chief complaint- Right groin/RLE pain with difficulty ambulating    This is a 86 YO female with no significant past medical history s/p mechanical fall on 8/9. Patient  was seen in ED. ct head and c-spine negative at that time. x-ray pelvis also neg. pt was discharged home and since then c/o worsening pain in the right hip when putting weight on it. Pt usually ambulates without any assistive device prior to the fall. Pt reports there is no pain at rest but pain usually worse with weight bearing and now having difficulty with ambulation. Pain usually relieves with tylenol. Pt admits to intermittent lower back pain but usually triggers by prolonged sitting. Pt denies numbness, tingling, swelling, fever, chills, n/v, abd pain, dysuria, urinary or bowel changes.    CT head today showed no acute intracranial trauma. Right frontal scalp soft tissue swelling. CT lumbar showed multiple chronic appearing compression fractures. Severe collapse of L1 with mild bony retropulsion  CT pelvis: acute minimally displaced right pubic body fx with extension to Rt inferior pubic ramus. Acute minimally displaced comminuted fx of right puboacetabular junction. moderate age indeterminate compression fx of L4, worse when compared to prior study. 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 No surgical intervention. OP vasculat f/u.     Patient was evaluated by PM&R and therapy for functional deficits, gait/ADL impairments and acute rehabilitation was recommended. Patient was medically optimized for discharge to [facility] IRU on 8/18/22.      ROS/subjective  Some improvement in right groin pain- can use ice/heat  no back pain  fell asleep with seroquel around midnight- some agitation earlier in night  no dizziness, no headaches  no nausea, no vomiting  no SOB, No chest pain  last BM 8/19- voiding and reportedly continent      MEDICATIONS  (STANDING):  enoxaparin Injectable 40 milliGRAM(s) SubCutaneous every 24 hours  multivitamin 1 Tablet(s) Oral daily  QUEtiapine 25 milliGRAM(s) Oral <User Schedule>    MEDICATIONS  (PRN):  acetaminophen     Tablet .. 650 milliGRAM(s) Oral every 6 hours PRN Temp greater or equal to 38C (100.4F), Mild Pain (1 - 3)  aluminum hydroxide/magnesium hydroxide/simethicone Suspension 30 milliLiter(s) Oral every 4 hours PRN Dyspepsia  melatonin 3 milliGRAM(s) Oral at bedtime PRN Insomnia  senna 2 Tablet(s) Oral at bedtime PRN Constipation  traMADol 50 milliGRAM(s) Oral every 6 hours PRN Moderate Pain (4 - 6)                            10.8   6.27  )-----------( 208      ( 22 Aug 2022 07:00 )             33.3     08-22    138  |  101  |  19  ----------------------------<  100<H>  3.6   |  27  |  0.86    Ca    9.2      22 Aug 2022 07:00    TPro  7.2  /  Alb  3.1<L>  /  TBili  0.4  /  DBili  x   /  AST  13  /  ALT  14  /  AlkPhos  104  08-22        CAPILLARY BLOOD GLUCOSE          Vital Signs Last 24 Hrs  T(C): 36.6 (22 Aug 2022 08:24), Max: 37.1 (21 Aug 2022 21:50)  T(F): 97.9 (22 Aug 2022 08:24), Max: 98.8 (21 Aug 2022 21:50)  HR: 78 (22 Aug 2022 08:24) (78 - 91)  BP: 132/84 (22 Aug 2022 08:24) (124/84 - 132/84)  BP(mean): --  RR: 16 (22 Aug 2022 08:24) (16 - 16)  SpO2: 99% (22 Aug 2022 08:24) (99% - 99%)    Parameters below as of 22 Aug 2022 08:24  Patient On (Oxygen Delivery Method): room air          Gen - NAD, Comfortable  HEENT - EOMI, MMM, right frontal scalp edema   Neck - Supple, No limited ROM  Pulm - CTAB, No wheeze, No rhonchi, No crackles  Cardiovascular - RRR, S1S2, No murmurs  Abdomen - Soft, NT/ND, +BS  Extremities - No clubbing, no cyanosis, no peripheral edema, no calf tenderness  Neuro-     Cognitive - Awake, Alert, Oriented to self, place- not time     Communication - Fluent     Attention: Intact      Memory: able to identify object, able to recall independently immediately, she needed cues to recall 2 objects 3 min later        Cranial Nerves - CN 2-12 intact     Motor -                     LEFT    UE - ShAB 5/5, EF 5/5, EE 5/5,  5/5                    RIGHT UE - ShAB 5/5, EF 5/5, EE 5/5,   5/5                    LEFT    LE - HF 4/5, KE 4/5, DF 5/5, PF 5/5                    RIGHT LE - HF 2/5 in chair- Limited by pain, good active heel glide in bed minimal pain KE 4/5, DF 5/5, PF 5/5        Sensory - Intact to LT     Reflexes - DTR Intact, No primitive reflexive     Coordination - FTN intact     Tone - normal  Psychiatric - Mood stable, Affect WNL  MSK: Kyphosis- no localized back pain on palpation  Skin:  all skin intact    Rehab eval in progress

## 2022-08-22 NOTE — PROGRESS NOTE ADULT - ASSESSMENT
This is a 88 YO female with no significant past medical history s/p mechanical fall on 8/9. CT lumbar showed multiple chronic appearing compression fractures. Severe collapse of L1 with mild bony retropulsion.CT pelvis: acute minimally displaced right pubic body fx with extension to Rt inferior pubic ramus. Acute minimally displaced comminuted fx of right puboacetabular junction. moderate age indeterminate compression fx of L4, worse. 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 Patient now with gait Instability, ADL impairments and Functional impairments.    #Acute right pelvic fx  #Multiple lumbar compression fx. Severe collapse of L1 with mild bony retropulsion  - Comprehensive Rehab Program: PT/OT, 3hours daily and 5 days weekly  - PT: Focused on improving strength, endurance, coordination, balance, functional mobility, and transfers  - OT: Focused on improving strength, fine motor skills, coordination, posture and ADLs.    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   - CT pelvis showed 1.7cm aneurysmal dilation of bilateral common iliac arteries.   - follow up vascular surgery as outpt    Poor sleep-initially sundowning  better somewhat on low dose Seroquel HS  will increase Seroquel to 25mg HS- time at 8PM    #Dyspepsia  - Maalox    #Pain management  - Tylenol PRN, Tramadol PRN    #DVT ppx  - Lovenox, SCD, TEDs    #Bowel Regimen  - Senna- will add miralax- no BM since 8/19    #Bladder management  - BS on admission, and q 8 hours (SC if > 400)  - Monitor UO    #FEN   - Diet: Regular  - Supplements: MVI    #Skin:  - No active issues at this time  - Pressure injury/Skin: Turn Q2hrs while in bed, OOB to Chair, PT/OT     #Precaution  - Fall

## 2022-08-23 PROCEDURE — 99233 SBSQ HOSP IP/OBS HIGH 50: CPT | Mod: GC

## 2022-08-23 PROCEDURE — 99232 SBSQ HOSP IP/OBS MODERATE 35: CPT

## 2022-08-23 RX ADMIN — Medication 1 TABLET(S): at 11:36

## 2022-08-23 RX ADMIN — ENOXAPARIN SODIUM 40 MILLIGRAM(S): 100 INJECTION SUBCUTANEOUS at 11:38

## 2022-08-23 RX ADMIN — QUETIAPINE FUMARATE 25 MILLIGRAM(S): 200 TABLET, FILM COATED ORAL at 20:12

## 2022-08-23 NOTE — PROGRESS NOTE ADULT - SUBJECTIVE AND OBJECTIVE BOX
chief complaint- Right groin/RLE pain with difficulty ambulating    This is a 88 YO female with no significant past medical history s/p mechanical fall on 8/9. Patient  was seen in ED. ct head and c-spine negative at that time. x-ray pelvis also neg. pt was discharged home and since then c/o worsening pain in the right hip when putting weight on it. Pt usually ambulates without any assistive device prior to the fall. Pt reports there is no pain at rest but pain usually worse with weight bearing and now having difficulty with ambulation. Pain usually relieves with tylenol. Pt admits to intermittent lower back pain but usually triggers by prolonged sitting. Pt denies numbness, tingling, swelling, fever, chills, n/v, abd pain, dysuria, urinary or bowel changes.    CT head today showed no acute intracranial trauma. Right frontal scalp soft tissue swelling. CT lumbar showed multiple chronic appearing compression fractures. Severe collapse of L1 with mild bony retropulsion  CT pelvis: acute minimally displaced right pubic body fx with extension to Rt inferior pubic ramus. Acute minimally displaced comminuted fx of right puboacetabular junction. moderate age indeterminate compression fx of L4, worse when compared to prior study. 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 No surgical intervention. OP vasculat f/u.     Patient was evaluated by PM&R and therapy for functional deficits, gait/ADL impairments and acute rehabilitation was recommended. Patient was medically optimized for discharge to [facility] IRU on 8/18/22.      ROS/subjective  minimal pain in right groin today-   no back pain- does not follow spinal precautions  slept well on increased seroquel dose  no dizziness, no headaches  no nausea, no vomiting  no SOB, No chest pain  moved bowels today      MEDICATIONS  (STANDING):  enoxaparin Injectable 40 milliGRAM(s) SubCutaneous every 24 hours  multivitamin 1 Tablet(s) Oral daily  QUEtiapine 25 milliGRAM(s) Oral <User Schedule>    MEDICATIONS  (PRN):  acetaminophen     Tablet .. 650 milliGRAM(s) Oral every 6 hours PRN Temp greater or equal to 38C (100.4F), Mild Pain (1 - 3)  aluminum hydroxide/magnesium hydroxide/simethicone Suspension 30 milliLiter(s) Oral every 4 hours PRN Dyspepsia  melatonin 3 milliGRAM(s) Oral at bedtime PRN Insomnia  senna 2 Tablet(s) Oral at bedtime PRN Constipation  traMADol 50 milliGRAM(s) Oral every 6 hours PRN Moderate Pain (4 - 6)                            10.8   6.27  )-----------( 208      ( 22 Aug 2022 07:00 )             33.3     08-22    138  |  101  |  19  ----------------------------<  100<H>  3.6   |  27  |  0.86    Ca    9.2      22 Aug 2022 07:00    TPro  7.2  /  Alb  3.1<L>  /  TBili  0.4  /  DBili  x   /  AST  13  /  ALT  14  /  AlkPhos  104  08-22        CAPILLARY BLOOD GLUCOSE          Vital Signs Last 24 Hrs  T(C): 36.9 (23 Aug 2022 07:29), Max: 36.9 (23 Aug 2022 07:29)  T(F): 98.5 (23 Aug 2022 07:29), Max: 98.5 (23 Aug 2022 07:29)  HR: 65 (23 Aug 2022 07:29) (65 - 96)  BP: 111/76 (23 Aug 2022 07:29) (111/76 - 115/84)  BP(mean): --  RR: 17 (23 Aug 2022 07:29) (17 - 17)  SpO2: 96% (23 Aug 2022 07:29) (96% - 98%)    Parameters below as of 23 Aug 2022 07:29  Patient On (Oxygen Delivery Method): room air        Gen - NAD, Comfortable  HEENT - EOMI, MMM, right frontal scalp edema   Neck - Supple, No limited ROM  Pulm - CTAB, No wheeze, No rhonchi, No crackles  Cardiovascular - RRR, S1S2, No murmurs  Abdomen - Soft, NT/ND, +BS  Extremities - No clubbing, no cyanosis, no peripheral edema, no calf tenderness  Neuro-     Cognitive - Awake, Alert, Oriented to self, place- not time     Communication - Fluent     Attention: Intact      Memory: able to identify object, able to recall independently immediately, she needed cues to recall 2 objects 3 min later        Cranial Nerves - CN 2-12 intact     Motor -                     LEFT    UE - ShAB 5/5, EF 5/5, EE 5/5,  5/5                    RIGHT UE - ShAB 5/5, EF 5/5, EE 5/5,   5/5                    LEFT    LE - HF 4/5, KE 4/5, DF 5/5, PF 5/5                    RIGHT LE - HF 3-3+/5 in chair-  KE 4/5, DF 5/5, PF 5/5        Sensory - Intact to LT     Reflexes - DTR Intact, No primitive reflexive     Coordination - FTN intact     Tone - normal  Psychiatric - Mood stable, Affect WNL  MSK: Kyphosis- no localized back pain on palpation  Skin:  all skin intact    IDT 8/23/22    barriers - impaired cognition  Home in Pvt house with spouse multiple levels with steps- I PTA no device    OT  CG Commode transfers  Min/mod A Dressing   CG toileting    PT  90' CG min RW  CG/min A transfers  CG/min A 12 steps    tentative DC   chief complaint- Right groin/RLE pain with difficulty ambulating    This is a 86 YO female with no significant past medical history s/p mechanical fall on 8/9. Patient  was seen in ED. ct head and c-spine negative at that time. x-ray pelvis also neg. pt was discharged home and since then c/o worsening pain in the right hip when putting weight on it. Pt usually ambulates without any assistive device prior to the fall. Pt reports there is no pain at rest but pain usually worse with weight bearing and now having difficulty with ambulation. Pain usually relieves with tylenol. Pt admits to intermittent lower back pain but usually triggers by prolonged sitting. Pt denies numbness, tingling, swelling, fever, chills, n/v, abd pain, dysuria, urinary or bowel changes.    CT head today showed no acute intracranial trauma. Right frontal scalp soft tissue swelling. CT lumbar showed multiple chronic appearing compression fractures. Severe collapse of L1 with mild bony retropulsion  CT pelvis: acute minimally displaced right pubic body fx with extension to Rt inferior pubic ramus. Acute minimally displaced comminuted fx of right puboacetabular junction. moderate age indeterminate compression fx of L4, worse when compared to prior study. 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 No surgical intervention. OP vasculat f/u.     Patient was evaluated by PM&R and therapy for functional deficits, gait/ADL impairments and acute rehabilitation was recommended. Patient was medically optimized for discharge to [facility] IRU on 8/18/22.      ROS/subjective  minimal pain in right groin today-   no back pain- does not follow spinal precautions  slept well on increased seroquel dose  no dizziness, no headaches  no nausea, no vomiting  no SOB, No chest pain  moved bowels today      MEDICATIONS  (STANDING):  enoxaparin Injectable 40 milliGRAM(s) SubCutaneous every 24 hours  multivitamin 1 Tablet(s) Oral daily  QUEtiapine 25 milliGRAM(s) Oral <User Schedule>    MEDICATIONS  (PRN):  acetaminophen     Tablet .. 650 milliGRAM(s) Oral every 6 hours PRN Temp greater or equal to 38C (100.4F), Mild Pain (1 - 3)  aluminum hydroxide/magnesium hydroxide/simethicone Suspension 30 milliLiter(s) Oral every 4 hours PRN Dyspepsia  melatonin 3 milliGRAM(s) Oral at bedtime PRN Insomnia  senna 2 Tablet(s) Oral at bedtime PRN Constipation  traMADol 50 milliGRAM(s) Oral every 6 hours PRN Moderate Pain (4 - 6)                            10.8   6.27  )-----------( 208      ( 22 Aug 2022 07:00 )             33.3     08-22    138  |  101  |  19  ----------------------------<  100<H>  3.6   |  27  |  0.86    Ca    9.2      22 Aug 2022 07:00    TPro  7.2  /  Alb  3.1<L>  /  TBili  0.4  /  DBili  x   /  AST  13  /  ALT  14  /  AlkPhos  104  08-22        CAPILLARY BLOOD GLUCOSE          Vital Signs Last 24 Hrs  T(C): 36.9 (23 Aug 2022 07:29), Max: 36.9 (23 Aug 2022 07:29)  T(F): 98.5 (23 Aug 2022 07:29), Max: 98.5 (23 Aug 2022 07:29)  HR: 65 (23 Aug 2022 07:29) (65 - 96)  BP: 111/76 (23 Aug 2022 07:29) (111/76 - 115/84)  BP(mean): --  RR: 17 (23 Aug 2022 07:29) (17 - 17)  SpO2: 96% (23 Aug 2022 07:29) (96% - 98%)    Parameters below as of 23 Aug 2022 07:29  Patient On (Oxygen Delivery Method): room air        Gen - NAD, Comfortable  HEENT - EOMI, MMM, right frontal scalp edema   Neck - Supple, No limited ROM  Pulm - CTAB, No wheeze, No rhonchi, No crackles  Cardiovascular - RRR, S1S2, No murmurs  Abdomen - Soft, NT/ND, +BS  Extremities - No clubbing, no cyanosis, no peripheral edema, no calf tenderness  Neuro-     Cognitive - Awake, Alert, Oriented to self, place- not time     Communication - Fluent     Attention: Intact      Memory: able to identify object, able to recall independently immediately, she needed cues to recall 2 objects 3 min later        Cranial Nerves - CN 2-12 intact     Motor -                     LEFT    UE - ShAB 5/5, EF 5/5, EE 5/5,  5/5                    RIGHT UE - ShAB 5/5, EF 5/5, EE 5/5,   5/5                    LEFT    LE - HF 4/5, KE 4/5, DF 5/5, PF 5/5                    RIGHT LE - HF 3-3+/5 in chair-  KE 4/5, DF 5/5, PF 5/5        Sensory - Intact to LT     Reflexes - DTR Intact, No primitive reflexive     Coordination - FTN intact     Tone - normal  Psychiatric - Mood stable, Affect WNL  MSK: Kyphosis- no localized back pain on palpation  Skin:  all skin intact    IDT 8/23/22    barriers - impaired cognition  Home in Pvt house with spouse multiple levels with steps- I PTA no device    OT  CG Commode transfers  Min/mod A Dressing   CG toileting    PT  90' CG min RW  CG/min A transfers  CG/min A 12 steps    tentative DC 8/31  ?family training prior to DC

## 2022-08-23 NOTE — PROGRESS NOTE ADULT - ASSESSMENT
This is a 86 YO female with no significant past medical history s/p mechanical fall on 8/9. CT lumbar showed multiple chronic appearing compression fractures. Severe collapse of L1 with mild bony retropulsion.CT pelvis: acute minimally displaced right pubic body fx with extension to Rt inferior pubic ramus. Acute minimally displaced comminuted fx of right puboacetabular junction. moderate age indeterminate compression fx of L4, worse. 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 Patient now with gait Instability, ADL impairments and Functional impairments.    #Acute right pelvic fx  #Multiple lumbar compression fx. Severe collapse of L1 with mild bony retropulsion  - Comprehensive Rehab Program: PT/OT, 3hours daily and 5 days weekly  - PT: Focused on improving strength, endurance, coordination, balance, functional mobility, and transfers  - OT: Focused on improving strength, fine motor skills, coordination, posture and ADLs.    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   - CT pelvis showed 1.7cm aneurysmal dilation of bilateral common iliac arteries.   - follow up vascular surgery as outpt    Poor sleep-initially sundowning  better somewhat on Seroquel to 25mg HS- time at 8PM    #Dyspepsia  - Maalox    #Pain management  - Tylenol PRN, Tramadol PRN    #DVT ppx  - Lovenox, SCD, TEDs    #Bowel Regimen  - Senna- will add miralax- no BM since 8/19  moved bowels today    #Bladder management  - BS on admission, and q 8 hours (SC if > 400)  - Monitor UO    #FEN   - Diet: Regular  - Supplements: MVI    #Skin:  - No active issues at this time  - Pressure injury/Skin: Turn Q2hrs while in bed, OOB to Chair, PT/OT     #Precaution  - Fall This is a 86 YO female with no significant past medical history s/p mechanical fall on 8/9. CT lumbar showed multiple chronic appearing compression fractures. Severe collapse of L1 with mild bony retropulsion.CT pelvis: acute minimally displaced right pubic body fx with extension to Rt inferior pubic ramus. Acute minimally displaced comminuted fx of right puboacetabular junction. moderate age indeterminate compression fx of L4, worse. 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 Patient now with gait Instability, ADL impairments and Functional impairments.    #Acute right pelvic fx  #Multiple lumbar compression fx. Severe collapse of L1 with mild bony retropulsion  - Comprehensive Rehab Program: PT/OT, 3hours daily and 5 days weekly  - PT: Focused on improving strength, endurance, coordination, balance, functional mobility, and transfers  - OT: Focused on improving strength, fine motor skills, coordination, posture and ADLs.    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   - CT pelvis showed 1.7cm aneurysmal dilation of bilateral common iliac arteries.   - follow up vascular surgery as outpt    Poor sleep-initially sundowning  better somewhat on Seroquel to 25mg HS- time at 8PM    #Dyspepsia  - Maalox    #Pain management  - Tylenol PRN, Tramadol PRN    #DVT ppx  - Lovenox, SCD, TEDs    #Bowel Regimen  - Senna- will add miralax- no BM since 8/19  moved bowels today    #Bladder management  - BS on admission, and q 8 hours (SC if > 400)  - Monitor UO    #FEN   - Diet: Regular  - Supplements: MVI    #Skin:  - No active issues at this time  - Pressure injury/Skin: Turn Q2hrs while in bed, OOB to Chair, PT/OT     #Precaution  - Fall    20 additional minutes spent today on coordination of care including team conference as well as phone conversation with patient's daughter Fiona - I updated her on plan of care- All questions were answered by me as well.

## 2022-08-23 NOTE — CHART NOTE - NSCHARTNOTEFT_GEN_A_CORE
Nutrition Follow Up Note  Hospital Course   (Per Electronic Medical Record)    Source:  Patient [X]  Medical Record [X]      Diet:   Regular Diet (IDDSI Level 7) w/ Thin Liquids (IDDSI Level 0)  Tolerates Diet Consistency Well  No Chewing/Swallowing Difficulties (Per Patient Observation)  No Recent Nausea, Vomiting, Diarrhea or Constipation (as Per Patient)  Consumes % of Meals (as Per Documentation) - State Good PO Intake/Appetite (Per Patient)   on Ensure Enlive 8oz PO BID (Provides 700kcal & 40grams of Protein)  Patient Takes Nutrition Supplement Well  Education Provided on Need for Supplementation    Enteral/Parenteral Nutrition: Not Applicable    Current Weight: 132.9lb on 8/18  Obtain New Weight  Obtain Weights Weekly     Pertinent Medications: MEDICATIONS  (STANDING):  enoxaparin Injectable 40 milliGRAM(s) SubCutaneous every 24 hours  multivitamin 1 Tablet(s) Oral daily  QUEtiapine 25 milliGRAM(s) Oral <User Schedule>    MEDICATIONS  (PRN):  acetaminophen     Tablet .. 650 milliGRAM(s) Oral every 6 hours PRN Temp greater or equal to 38C (100.4F), Mild Pain (1 - 3)  aluminum hydroxide/magnesium hydroxide/simethicone Suspension 30 milliLiter(s) Oral every 4 hours PRN Dyspepsia  melatonin 3 milliGRAM(s) Oral at bedtime PRN Insomnia  senna 2 Tablet(s) Oral at bedtime PRN Constipation  traMADol 50 milliGRAM(s) Oral every 6 hours PRN Moderate Pain (4 - 6)    Pertinent Labs:  08-22 Na138 mmol/L Glu 100 mg/dL<H> K+ 3.6 mmol/L Cr  0.86 mg/dL BUN 19 mg/dL 08-22 Alb 3.1 g/dL<L>    Skin: No Pressure Ulcers     Edema: None Noted (as Per Documentation)     Last Bowel Movement: on 8/19    Estimated Needs:   [X] No Change Since Previous Assessment    Previous Nutrition Diagnosis:   Severe Malnutrition     Nutrition Diagnosis is [X] Ongoing - Patient Continues on Nutrition Supplement, Patient Takes Nutrition Supplement & Nutrition Education Provided on Need for Supplementation     New Nutrition Diagnosis: [X] Not Applicable    Interventions:   1. Education Provided on Need for Supplementation   2. Recommend Continue Nutrition Plan of Care     Monitoring & Evaluation:   [X] Weights   [X] PO Intake   [X] Skin Integrity   [X] Follow Up (Per Protocol)  [X] Tolerance to Diet Prescription   [X] Other: Labs     Registered Dietitian/Nutritionist Remains Available.  Isaias Iniguez RDN    Pager #[phone]  Phone# [phone]

## 2022-08-23 NOTE — PROGRESS NOTE ADULT - SUBJECTIVE AND OBJECTIVE BOX
Patient is a 87y old  Female who presents with a chief complaint of Lumbar compression fracture and pelvic fx (22 Aug 2022 12:54)      Patient seen and examined at bedside.  No events overnight. Patient denies any acute complaints at this time, feeling better today. Denies headache, changes in vision, chest pain or SOB.      ALLERGIES:  No Known Allergies    MEDICATIONS  (STANDING):  enoxaparin Injectable 40 milliGRAM(s) SubCutaneous every 24 hours  multivitamin 1 Tablet(s) Oral daily  QUEtiapine 25 milliGRAM(s) Oral <User Schedule>    MEDICATIONS  (PRN):  acetaminophen     Tablet .. 650 milliGRAM(s) Oral every 6 hours PRN Temp greater or equal to 38C (100.4F), Mild Pain (1 - 3)  aluminum hydroxide/magnesium hydroxide/simethicone Suspension 30 milliLiter(s) Oral every 4 hours PRN Dyspepsia  melatonin 3 milliGRAM(s) Oral at bedtime PRN Insomnia  senna 2 Tablet(s) Oral at bedtime PRN Constipation  traMADol 50 milliGRAM(s) Oral every 6 hours PRN Moderate Pain (4 - 6)    Vital Signs Last 24 Hrs  T(F): 98.5 (23 Aug 2022 07:29), Max: 98.5 (23 Aug 2022 07:29)  HR: 65 (23 Aug 2022 07:29) (65 - 96)  BP: 111/76 (23 Aug 2022 07:29) (111/76 - 115/84)  RR: 17 (23 Aug 2022 07:29) (17 - 17)  SpO2: 96% (23 Aug 2022 07:29) (96% - 98%)  I&O's Summary      PHYSICAL EXAM:  General: NAD, Awake, alert, pleasant, laying in bed  ENT: MMM, no tonsilar exudate  Neck: Supple, No JVD  Lungs: Clear to auscultation bilaterally, no wheezes. Good air entry bilaterally   Cardio: RRR, S1/S2, No murmurs  Abdomen: Soft, Nontender, Nondistended; Bowel sounds present  Extremities: No calf tenderness, No pitting edema    LABS:                        10.8   6.27  )-----------( 208      ( 22 Aug 2022 07:00 )             33.3     08-22    138  |  101  |  19  ----------------------------<  100  3.6   |  27  |  0.86    Ca    9.2      22 Aug 2022 07:00    TPro  7.2  /  Alb  3.1  /  TBili  0.4  /  DBili  x   /  AST  13  /  ALT  14  /  AlkPhos  104  08-22                                    COVID-19 PCR: NotDetec (08-17-22 @ 17:10)  COVID-19 PCR: NotDetec (08-08-22 @ 20:54)      RADIOLOGY & ADDITIONAL TESTS: reviewed labs    Care Discussed with Consultants/Other Providers: discussed with Dr. Snowden

## 2022-08-23 NOTE — PROGRESS NOTE ADULT - ASSESSMENT
86 yo F with no significant PMH who is s/p mechanical fall 8/9. Found to have multiple chronic appearing compression fractures. Severe collapse of L1 with mild bony retropulsion.CT pelvis: acute minimally displaced right pubic body fx with extension to Rt inferior pubic ramus. Acute minimally displaced comminuted fx of right puboacetabular junction. moderate age indeterminate compression fx of L4, worse. 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 Now admitted to [facility] rehab for functional decline.     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   - CT pelvis showed 1.7cm aneurysmal dilation of bilateral common iliac arteries.   - follow up vascular surgery as outpt    #mood disorder  - seroquel increased to 25mg qhs as per rehab team     #DVT Prophylaxis:  - Lovenox

## 2022-08-24 PROCEDURE — 99232 SBSQ HOSP IP/OBS MODERATE 35: CPT

## 2022-08-24 PROCEDURE — 99232 SBSQ HOSP IP/OBS MODERATE 35: CPT | Mod: GC

## 2022-08-24 RX ORDER — OLANZAPINE 15 MG/1
2.5 TABLET, FILM COATED ORAL ONCE
Refills: 0 | Status: COMPLETED | OUTPATIENT
Start: 2022-08-24 | End: 2022-08-24

## 2022-08-24 RX ORDER — OLANZAPINE 15 MG/1
2.5 TABLET, FILM COATED ORAL EVERY 24 HOURS
Refills: 0 | Status: DISCONTINUED | OUTPATIENT
Start: 2022-08-24 | End: 2022-08-27

## 2022-08-24 RX ADMIN — OLANZAPINE 2.5 MILLIGRAM(S): 15 TABLET, FILM COATED ORAL at 04:00

## 2022-08-24 RX ADMIN — ENOXAPARIN SODIUM 40 MILLIGRAM(S): 100 INJECTION SUBCUTANEOUS at 11:32

## 2022-08-24 RX ADMIN — QUETIAPINE FUMARATE 25 MILLIGRAM(S): 200 TABLET, FILM COATED ORAL at 20:08

## 2022-08-24 RX ADMIN — Medication 3 MILLIGRAM(S): at 21:26

## 2022-08-24 RX ADMIN — Medication 1 TABLET(S): at 11:32

## 2022-08-24 NOTE — PROGRESS NOTE ADULT - SUBJECTIVE AND OBJECTIVE BOX
Patient is a 87y old  Female who presents with a chief complaint of Lumbar compression fracture and pelvic fx (23 Aug 2022 17:25)      Patient seen and examined at bedside.  No events overnight. Patient denies any acute complaints at this time, feeling better today. Denies headache, changes in vision, chest pain or SOB.    ALLERGIES:  No Known Allergies    MEDICATIONS  (STANDING):  enoxaparin Injectable 40 milliGRAM(s) SubCutaneous every 24 hours  multivitamin 1 Tablet(s) Oral daily  QUEtiapine 25 milliGRAM(s) Oral <User Schedule>    MEDICATIONS  (PRN):  acetaminophen     Tablet .. 650 milliGRAM(s) Oral every 6 hours PRN Temp greater or equal to 38C (100.4F), Mild Pain (1 - 3)  aluminum hydroxide/magnesium hydroxide/simethicone Suspension 30 milliLiter(s) Oral every 4 hours PRN Dyspepsia  melatonin 3 milliGRAM(s) Oral at bedtime PRN Insomnia  senna 2 Tablet(s) Oral at bedtime PRN Constipation  traMADol 50 milliGRAM(s) Oral every 6 hours PRN Moderate Pain (4 - 6)    Vital Signs Last 24 Hrs  T(F): 97.6 (24 Aug 2022 08:04), Max: 98.8 (23 Aug 2022 20:06)  HR: 84 (24 Aug 2022 08:04) (75 - 84)  BP: 98/64 (24 Aug 2022 08:04) (98/64 - 116/77)  RR: 17 (24 Aug 2022 08:04) (17 - 17)  SpO2: 97% (24 Aug 2022 08:04) (97% - 97%)  I&O's Summary      PHYSICAL EXAM:  General: NAD, Awake, alert, pleasant, laying in bed  ENT: MMM, no tonsilar exudate  Neck: Supple, No JVD  Lungs: Clear to auscultation bilaterally, no wheezes. Good air entry bilaterally   Cardio: RRR, S1/S2, No murmurs  Abdomen: Soft, Nontender, Nondistended; Bowel sounds present  Extremities: No calf tenderness, No pitting edema    LABS:                        10.8   6.27  )-----------( 208      ( 22 Aug 2022 07:00 )             33.3     08-22    138  |  101  |  19  ----------------------------<  100  3.6   |  27  |  0.86    Ca    9.2      22 Aug 2022 07:00    TPro  7.2  /  Alb  3.1  /  TBili  0.4  /  DBili  x   /  AST  13  /  ALT  14  /  AlkPhos  104  08-22                                    COVID-19 PCR: NotDetec (08-17-22 @ 17:10)  COVID-19 PCR: NotDetec (08-08-22 @ 20:54)      RADIOLOGY & ADDITIONAL TESTS: reviewed labs    Care Discussed with Consultants/Other Providers: discussed with Dr. Snowden

## 2022-08-24 NOTE — PROGRESS NOTE ADULT - ASSESSMENT
86 yo F with no significant PMH who is s/p mechanical fall 8/9. Found to have multiple chronic appearing compression fractures. Severe collapse of L1 with mild bony retropulsion.CT pelvis: acute minimally displaced right pubic body fx with extension to Rt inferior pubic ramus. Acute minimally displaced comminuted fx of right puboacetabular junction. moderate age indeterminate compression fx of L4, worse. 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 Now admitted to [facility] rehab for functional decline.     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   - CT pelvis showed 1.7cm aneurysmal dilation of bilateral common iliac arteries.   - follow up vascular surgery as outpt    #mood disorder  - seroquel increased to 25mg qhs as per rehab team     #DVT Prophylaxis:  - Lovenox

## 2022-08-24 NOTE — PROGRESS NOTE ADULT - ASSESSMENT
This is a 86 YO female with no significant past medical history s/p mechanical fall on 8/9. CT lumbar showed multiple chronic appearing compression fractures. Severe collapse of L1 with mild bony retropulsion.CT pelvis: acute minimally displaced right pubic body fx with extension to Rt inferior pubic ramus. Acute minimally displaced comminuted fx of right puboacetabular junction. moderate age indeterminate compression fx of L4, worse. 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 Patient now with gait Instability, ADL impairments and Functional impairments.    #Acute right pelvic fx  #Multiple lumbar compression fx. Severe collapse of L1 with mild bony retropulsion  - Comprehensive Rehab Program: PT/OT, 3hours daily and 5 days weekly  - PT: Focused on improving strength, endurance, coordination, balance, functional mobility, and transfers  - OT: Focused on improving strength, fine motor skills, coordination, posture and ADLs.    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   - CT pelvis showed 1.7cm aneurysmal dilation of bilateral common iliac arteries.   - follow up vascular surgery as outpt    Poor sleep-initially sundowning   on Seroquel to 25mg HS- time at 8PM  needed Zyprexa last night IM- will order PRN if Refuses seroquel    #Dyspepsia  - Maalox    #Pain management  - Tylenol PRN, Tramadol PRN    #DVT ppx  - Lovenox, SCD, TEDs    #Bowel Regimen  - Senna- will add miralax- no BM since 8/19  moved bowels yesterday    #Bladder management  - BS on admission, and q 8 hours (SC if > 400)  - Monitor UO    #FEN   - Diet: Regular  - Supplements: MVI    #Skin:  - No active issues at this time  - Pressure injury/Skin: Turn Q2hrs while in bed, OOB to Chair, PT/OT     #Precaution  - Fall

## 2022-08-24 NOTE — PROGRESS NOTE ADULT - SUBJECTIVE AND OBJECTIVE BOX
chief complaint- Right groin/RLE pain with difficulty ambulating    This is a 88 YO female with no significant past medical history s/p mechanical fall on 8/9. Patient  was seen in ED. ct head and c-spine negative at that time. x-ray pelvis also neg. pt was discharged home and since then c/o worsening pain in the right hip when putting weight on it. Pt usually ambulates without any assistive device prior to the fall. Pt reports there is no pain at rest but pain usually worse with weight bearing and now having difficulty with ambulation. Pain usually relieves with tylenol. Pt admits to intermittent lower back pain but usually triggers by prolonged sitting. Pt denies numbness, tingling, swelling, fever, chills, n/v, abd pain, dysuria, urinary or bowel changes.    CT head today showed no acute intracranial trauma. Right frontal scalp soft tissue swelling. CT lumbar showed multiple chronic appearing compression fractures. Severe collapse of L1 with mild bony retropulsion  CT pelvis: acute minimally displaced right pubic body fx with extension to Rt inferior pubic ramus. Acute minimally displaced comminuted fx of right puboacetabular junction. moderate age indeterminate compression fx of L4, worse when compared to prior study. 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 No surgical intervention. OP vasculat f/u.     Patient was evaluated by PM&R and therapy for functional deficits, gait/ADL impairments and acute rehabilitation was recommended. Patient was medically optimized for discharge to [facility] IRU on 8/18/22.      ROS/subjective  minimal pain in right groin today-   no back pain- does not follow spinal precautions  Agitated last night- needed IM Zyprexa- Ok this AM  no dizziness, no headaches  no nausea, no vomiting  no SOB, No chest pain  moved bowels today      MEDICATIONS  (STANDING):  enoxaparin Injectable 40 milliGRAM(s) SubCutaneous every 24 hours  multivitamin 1 Tablet(s) Oral daily  QUEtiapine 25 milliGRAM(s) Oral <User Schedule>    MEDICATIONS  (PRN):  acetaminophen     Tablet .. 650 milliGRAM(s) Oral every 6 hours PRN Temp greater or equal to 38C (100.4F), Mild Pain (1 - 3)  aluminum hydroxide/magnesium hydroxide/simethicone Suspension 30 milliLiter(s) Oral every 4 hours PRN Dyspepsia  melatonin 3 milliGRAM(s) Oral at bedtime PRN Insomnia  senna 2 Tablet(s) Oral at bedtime PRN Constipation  traMADol 50 milliGRAM(s) Oral every 6 hours PRN Moderate Pain (4 - 6)                            10.8   6.27  )-----------( 208      ( 22 Aug 2022 07:00 )             33.3     08-22    138  |  101  |  19  ----------------------------<  100<H>  3.6   |  27  |  0.86    Ca    9.2      22 Aug 2022 07:00    TPro  7.2  /  Alb  3.1<L>  /  TBili  0.4  /  DBili  x   /  AST  13  /  ALT  14  /  AlkPhos  104  08-22        CAPILLARY BLOOD GLUCOSE          Vital Signs Last 24 Hrs  T(C): 36.4 (24 Aug 2022 08:04), Max: 37.1 (23 Aug 2022 20:06)  T(F): 97.6 (24 Aug 2022 08:04), Max: 98.8 (23 Aug 2022 20:06)  HR: 84 (24 Aug 2022 08:04) (75 - 84)  BP: 98/64 (24 Aug 2022 08:04) (98/64 - 116/77)  BP(mean): --  RR: 17 (24 Aug 2022 08:04) (17 - 17)  SpO2: 97% (24 Aug 2022 08:04) (97% - 97%)    Parameters below as of 24 Aug 2022 08:04  Patient On (Oxygen Delivery Method): room air      Gen - NAD, Comfortable  HEENT - EOMI, MMM, right frontal scalp edema   Neck - Supple, No limited ROM  Pulm - CTAB, No wheeze, No rhonchi, No crackles  Cardiovascular - RRR, S1S2, No murmurs  Abdomen - Soft, NT/ND, +BS  Extremities - No clubbing, no cyanosis, no peripheral edema, no calf tenderness  Neuro-     Cognitive - Awake, Alert, Oriented to self, place- not time     Communication - Fluent     Attention: Intact      Memory: able to identify object, able to recall independently immediately, she needed cues to recall 2 objects 3 min later        Cranial Nerves - CN 2-12 intact     Motor -                     LEFT    UE - ShAB 5/5, EF 5/5, EE 5/5,  5/5                    RIGHT UE - ShAB 5/5, EF 5/5, EE 5/5,   5/5                    LEFT    LE - HF 4/5, KE 4/5, DF 5/5, PF 5/5                    RIGHT LE - HF 3-3+/5 in chair-  KE 4/5, DF 5/5, PF 5/5        Sensory - Intact to LT     Reflexes - DTR Intact, No primitive reflexive     Coordination - FTN intact     Tone - normal  Psychiatric - Mood stable, Affect WNL  MSK: Kyphosis- no localized back pain on palpation  Skin:  all skin intact    IDT 8/23/22    barriers - impaired cognition  Home in Pvt house with spouse multiple levels with steps- I PTA no device    OT  CG Commode transfers  Min/mod A Dressing   CG toileting    PT  90' CG min RW  CG/min A transfers  CG/min A 12 steps    tentative DC 8/31  ?family training prior to DC

## 2022-08-25 ENCOUNTER — TRANSCRIPTION ENCOUNTER (OUTPATIENT)
Age: 87
End: 2022-08-25

## 2022-08-25 LAB
ALBUMIN SERPL ELPH-MCNC: 3.5 G/DL — SIGNIFICANT CHANGE UP (ref 3.3–5)
ALP SERPL-CCNC: 129 U/L — HIGH (ref 40–120)
ALT FLD-CCNC: 19 U/L — SIGNIFICANT CHANGE UP (ref 10–45)
ANION GAP SERPL CALC-SCNC: 9 MMOL/L — SIGNIFICANT CHANGE UP (ref 5–17)
AST SERPL-CCNC: 17 U/L — SIGNIFICANT CHANGE UP (ref 10–40)
BILIRUB SERPL-MCNC: 0.4 MG/DL — SIGNIFICANT CHANGE UP (ref 0.2–1.2)
BUN SERPL-MCNC: 28 MG/DL — HIGH (ref 7–23)
CALCIUM SERPL-MCNC: 9.6 MG/DL — SIGNIFICANT CHANGE UP (ref 8.4–10.5)
CHLORIDE SERPL-SCNC: 101 MMOL/L — SIGNIFICANT CHANGE UP (ref 96–108)
CO2 SERPL-SCNC: 29 MMOL/L — SIGNIFICANT CHANGE UP (ref 22–31)
CREAT SERPL-MCNC: 1.01 MG/DL — SIGNIFICANT CHANGE UP (ref 0.5–1.3)
EGFR: 55 ML/MIN/1.73M2 — LOW
GLUCOSE SERPL-MCNC: 95 MG/DL — SIGNIFICANT CHANGE UP (ref 70–99)
HCT VFR BLD CALC: 36.6 % — SIGNIFICANT CHANGE UP (ref 34.5–45)
HGB BLD-MCNC: 11.8 G/DL — SIGNIFICANT CHANGE UP (ref 11.5–15.5)
MCHC RBC-ENTMCNC: 30.8 PG — SIGNIFICANT CHANGE UP (ref 27–34)
MCHC RBC-ENTMCNC: 32.2 GM/DL — SIGNIFICANT CHANGE UP (ref 32–36)
MCV RBC AUTO: 95.6 FL — SIGNIFICANT CHANGE UP (ref 80–100)
NRBC # BLD: 0 /100 WBCS — SIGNIFICANT CHANGE UP (ref 0–0)
PLATELET # BLD AUTO: 224 K/UL — SIGNIFICANT CHANGE UP (ref 150–400)
POTASSIUM SERPL-MCNC: 3.8 MMOL/L — SIGNIFICANT CHANGE UP (ref 3.5–5.3)
POTASSIUM SERPL-SCNC: 3.8 MMOL/L — SIGNIFICANT CHANGE UP (ref 3.5–5.3)
PROT SERPL-MCNC: 7.8 G/DL — SIGNIFICANT CHANGE UP (ref 6–8.3)
RBC # BLD: 3.83 M/UL — SIGNIFICANT CHANGE UP (ref 3.8–5.2)
RBC # FLD: 13.2 % — SIGNIFICANT CHANGE UP (ref 10.3–14.5)
SARS-COV-2 RNA SPEC QL NAA+PROBE: SIGNIFICANT CHANGE UP
SODIUM SERPL-SCNC: 139 MMOL/L — SIGNIFICANT CHANGE UP (ref 135–145)
WBC # BLD: 5.89 K/UL — SIGNIFICANT CHANGE UP (ref 3.8–10.5)
WBC # FLD AUTO: 5.89 K/UL — SIGNIFICANT CHANGE UP (ref 3.8–10.5)

## 2022-08-25 PROCEDURE — 99232 SBSQ HOSP IP/OBS MODERATE 35: CPT | Mod: GC

## 2022-08-25 PROCEDURE — 99232 SBSQ HOSP IP/OBS MODERATE 35: CPT

## 2022-08-25 RX ORDER — ACETAMINOPHEN 500 MG
2 TABLET ORAL
Qty: 0 | Refills: 0 | DISCHARGE
Start: 2022-08-25

## 2022-08-25 RX ORDER — QUETIAPINE FUMARATE 200 MG/1
1 TABLET, FILM COATED ORAL
Qty: 30 | Refills: 0
Start: 2022-08-25 | End: 2022-09-23

## 2022-08-25 RX ADMIN — ENOXAPARIN SODIUM 40 MILLIGRAM(S): 100 INJECTION SUBCUTANEOUS at 11:21

## 2022-08-25 RX ADMIN — Medication 1 TABLET(S): at 11:21

## 2022-08-25 RX ADMIN — QUETIAPINE FUMARATE 25 MILLIGRAM(S): 200 TABLET, FILM COATED ORAL at 19:48

## 2022-08-25 RX ADMIN — Medication 3 MILLIGRAM(S): at 21:21

## 2022-08-25 NOTE — DISCHARGE NOTE PROVIDER - NSDCHHATTENDCERT_GEN_ALL_CORE
93
My signature below certifies that the above stated patient is homebound and upon completion of the Face-To-Face encounter, has the need for intermittent skilled nursing, physical therapy and/or speech or occupational therapy services in their home for their current diagnosis as outlined in their initial plan of care. These services will continue to be monitored by myself or another physician.

## 2022-08-25 NOTE — DISCHARGE NOTE PROVIDER - HOSPITAL COURSE
HPI:  This is a 88 YO female with no significant past medical history s/p mechanical fall on 8/9. Patient  was seen in ED. ct head and c-spine negative at that time. x-ray pelvis also neg. pt was discharged home and since then c/o worsening pain in the right hip when putting weight on it. Pt usually ambulates without any assistive device prior to the fall. Pt reports there is no pain at rest but pain usually worse with weight bearing and now having difficulty with ambulation. Pain usually relieves with tylenol. Pt admits to intermittent lower back pain but usually triggers by prolonged sitting. Pt denies numbness, tingling, swelling, fever, chills, n/v, abd pain, dysuria, urinary or bowel changes.    CT head today showed no acute intracranial trauma. Right frontal scalp soft tissue swelling. CT lumbar showed multiple chronic appearing compression fractures. Severe collapse of L1 with mild bony retropulsion  CT pelvis: acute minimally displaced right pubic body fx with extension to Rt inferior pubic ramus. Acute minimally displaced comminuted fx of right puboacetabular junction. moderate age indeterminate compression fx of L4, worse when compared to prior study. 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 No surgical intervention. OP vasculat f/u.     Patient was evaluated by PM&R and therapy for functional deficits, gait/ADL impairments and acute rehabilitation was recommended. Patient was medically optimized for discharge to [facility] IRU on 8/18/22.     (18 Aug 2022 16:20)      Rehab course significant for agitation especially at night. Patient was started on Seroquel     Functional Status:      All other medical co-morbidities were stable. Patient tolerated course of inpatient PT/OT rehab with significant improvements and met rehab goals prior to discharge. Discharge instructions were discussed with patient and family. All questions answered and all concerns addressed. Patient was medically cleared for discharge to home.     Outpatient Follow-ups:   HPI:  This is a 86 YO female with no significant past medical history s/p mechanical fall on 8/9. Patient  was seen in ED. ct head and c-spine negative at that time. x-ray pelvis also neg. pt was discharged home and since then c/o worsening pain in the right hip when putting weight on it. Pt usually ambulates without any assistive device prior to the fall. Pt reports there is no pain at rest but pain usually worse with weight bearing and now having difficulty with ambulation. Pain usually relieves with tylenol. Pt admits to intermittent lower back pain but usually triggers by prolonged sitting. Pt denies numbness, tingling, swelling, fever, chills, n/v, abd pain, dysuria, urinary or bowel changes.    CT head today showed no acute intracranial trauma. Right frontal scalp soft tissue swelling. CT lumbar showed multiple chronic appearing compression fractures. Severe collapse of L1 with mild bony retropulsion  CT pelvis: acute minimally displaced right pubic body fx with extension to Rt inferior pubic ramus. Acute minimally displaced comminuted fx of right puboacetabular junction. moderate age indeterminate compression fx of L4, worse when compared to prior study. 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 No surgical intervention. OP vasculat f/u.     Patient was evaluated by PM&R and therapy for functional deficits, gait/ADL impairments and acute rehabilitation was recommended. Patient was medically optimized for discharge to [facility] IRU on 8/18/22.     (18 Aug 2022 16:20)      Rehab course significant for agitation especially at night. Patient was started on Seroquel which was increased to 25mg at 8PM. Patient required Zyprexa 2.5mg IM x1 overnight, after she refused PO Seroquel.    All other medical co-morbidities were stable. Patient tolerated course of inpatient PT/OT rehab with significant improvements and met rehab goals prior to discharge. Discharge instructions were discussed with patient and family. All questions answered and all concerns addressed. Patient was medically cleared for discharge to home.

## 2022-08-25 NOTE — DISCHARGE NOTE PROVIDER - CARE PROVIDER_API CALL
Armando Snowden)  PhysicalRehab Medicine  [address] [place] [address]  Phone: [phone]  Fax: [phone]  Follow Up Time:

## 2022-08-25 NOTE — PROGRESS NOTE ADULT - ASSESSMENT
This is a 86 YO female with no significant past medical history s/p mechanical fall on 8/9. CT lumbar showed multiple chronic appearing compression fractures. Severe collapse of L1 with mild bony retropulsion.CT pelvis: acute minimally displaced right pubic body fx with extension to Rt inferior pubic ramus. Acute minimally displaced comminuted fx of right puboacetabular junction. moderate age indeterminate compression fx of L4, worse. 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 Patient now with gait Instability, ADL impairments and Functional impairments.    #Acute right pelvic fx  #Multiple lumbar compression fx. Severe collapse of L1 with mild bony retropulsion  - Comprehensive Rehab Program: PT/OT, 3hours daily and 5 days weekly  - PT: Focused on improving strength, endurance, coordination, balance, functional mobility, and transfers  - OT: Focused on improving strength, fine motor skills, coordination, posture and ADLs.    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   - CT pelvis showed 1.7cm aneurysmal dilation of bilateral common iliac arteries.   - follow up vascular surgery as outpt    Poor sleep-initially sundowning   on Seroquel to 25mg HS- time at 8PM  needed ZyprexaPRN if Refuses seroquel  Enhanced supervision    #Dyspepsia  - Maalox    #Pain management  - Tylenol PRN, Tramadol PRN    #DVT ppx  - Lovenox, SCD, TEDs    #Bowel Regimen  - Senna- miralax  moved bowels 8/23    #Bladder management  - BS on admission, and q 8 hours (SC if > 400)  - Monitor UO  voiding well    #FEN   - Diet: Regular  - Supplements: MVI    #Skin:  - No active issues at this time  - Pressure injury/Skin: Turn Q2hrs while in bed, OOB to Chair, PT/OT     #Precaution  - Fall

## 2022-08-25 NOTE — PROGRESS NOTE ADULT - SUBJECTIVE AND OBJECTIVE BOX
chief complaint- Right groin/RLE pain with difficulty ambulating    This is a 88 YO female with no significant past medical history s/p mechanical fall on 8/9. Patient  was seen in ED. ct head and c-spine negative at that time. x-ray pelvis also neg. pt was discharged home and since then c/o worsening pain in the right hip when putting weight on it. Pt usually ambulates without any assistive device prior to the fall. Pt reports there is no pain at rest but pain usually worse with weight bearing and now having difficulty with ambulation. Pain usually relieves with tylenol. Pt admits to intermittent lower back pain but usually triggers by prolonged sitting. Pt denies numbness, tingling, swelling, fever, chills, n/v, abd pain, dysuria, urinary or bowel changes.    CT head today showed no acute intracranial trauma. Right frontal scalp soft tissue swelling. CT lumbar showed multiple chronic appearing compression fractures. Severe collapse of L1 with mild bony retropulsion  CT pelvis: acute minimally displaced right pubic body fx with extension to Rt inferior pubic ramus. Acute minimally displaced comminuted fx of right puboacetabular junction. moderate age indeterminate compression fx of L4, worse when compared to prior study. 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 No surgical intervention. OP vasculat f/u.     Patient was evaluated by PM&R and therapy for functional deficits, gait/ADL impairments and acute rehabilitation was recommended. Patient was medically optimized for discharge to [facility] IRU on 8/18/22.      ROS/subjective  pain in right groin today- reported as 7/10 but not limiting function  no back pain- does not follow spinal precautions  slept well with seroquel  no dizziness, no headaches  no nausea, no vomiting  no SOB, No chest pain  moved bowels 8/23      MEDICATIONS  (STANDING):  enoxaparin Injectable 40 milliGRAM(s) SubCutaneous every 24 hours  multivitamin 1 Tablet(s) Oral daily  QUEtiapine 25 milliGRAM(s) Oral <User Schedule>    MEDICATIONS  (PRN):  acetaminophen     Tablet .. 650 milliGRAM(s) Oral every 6 hours PRN Temp greater or equal to 38C (100.4F), Mild Pain (1 - 3)  aluminum hydroxide/magnesium hydroxide/simethicone Suspension 30 milliLiter(s) Oral every 4 hours PRN Dyspepsia  melatonin 3 milliGRAM(s) Oral at bedtime PRN Insomnia  OLANZapine 2.5 milliGRAM(s) Oral every 24 hours PRN Agitation  senna 2 Tablet(s) Oral at bedtime PRN Constipation  traMADol 50 milliGRAM(s) Oral every 6 hours PRN Moderate Pain (4 - 6)                            11.8   5.89  )-----------( 224      ( 25 Aug 2022 06:20 )             36.6     08-25    139  |  101  |  28<H>  ----------------------------<  95  3.8   |  29  |  1.01    Ca    9.6      25 Aug 2022 06:20    TPro  7.8  /  Alb  3.5  /  TBili  0.4  /  DBili  x   /  AST  17  /  ALT  19  /  AlkPhos  129<H>  08-25        CAPILLARY BLOOD GLUCOSE          Vital Signs Last 24 Hrs  T(C): 36.7 (25 Aug 2022 08:13), Max: 36.7 (25 Aug 2022 08:13)  T(F): 98 (25 Aug 2022 08:13), Max: 98 (25 Aug 2022 08:13)  HR: 79 (25 Aug 2022 08:13) (79 - 96)  BP: 105/67 (25 Aug 2022 08:13) (105/67 - 113/72)  BP(mean): --  RR: 15 (25 Aug 2022 08:13) (15 - 17)  SpO2: 98% (25 Aug 2022 08:13) (96% - 98%)    Parameters below as of 25 Aug 2022 08:13  Patient On (Oxygen Delivery Method): room air        Gen - NAD, Comfortable  HEENT - EOMI, MMM, right frontal scalp edema   Neck - Supple, No limited ROM  Pulm - CTAB, No wheeze, No rhonchi, No crackles  Cardiovascular - RRR, S1S2, No murmurs  Abdomen - Soft, NT/ND, +BS  Extremities - No clubbing, no cyanosis, no peripheral edema, no calf tenderness  Neuro-     Cognitive - Awake, Alert, Oriented to self, place- not time     Communication - Fluent     Attention: Intact      Memory: able to identify object, able to recall independently immediately, she needed cues to recall 2 objects 3 min later        Cranial Nerves - CN 2-12 intact     Motor -                     LEFT    UE - ShAB 5/5, EF 5/5, EE 5/5,  5/5                    RIGHT UE - ShAB 5/5, EF 5/5, EE 5/5,   5/5                    LEFT    LE - HF 4/5, KE 4/5, DF 5/5, PF 5/5                    RIGHT LE - HF 3-3+/5 in chair-  KE 4/5, DF 5/5, PF 5/5  - Able to SLR in Bed- sl pain      Sensory - Intact to LT     Reflexes - DTR Intact, No primitive reflexive     Coordination - FTN intact     Tone - normal  Psychiatric - Mood stable, Affect WNL  MSK: Kyphosis- no localized back pain on palpation  Skin:  all skin intact    IDT 8/23/22    barriers - impaired cognition  Home in Pvt house with spouse multiple levels with steps- I PTA no device    OT  CG Commode transfers  Min/mod A Dressing   CG toileting    PT  90' CG min RW  CG/min A transfers  CG/min A 12 steps    tentative DC 8/31  ?family training prior to DC

## 2022-08-25 NOTE — PROGRESS NOTE ADULT - SUBJECTIVE AND OBJECTIVE BOX
Patient is a 87y old  Female who presents with a chief complaint of Lumbar compression fracture and pelvic fx (24 Aug 2022 12:31)    Patient seen and examined at bedside.  No events overnight. Patient denies any acute complaints at this time, feeling better today. Denies headache, changes in vision, chest pain or SOB.    ALLERGIES:  No Known Allergies    MEDICATIONS  (STANDING):  enoxaparin Injectable 40 milliGRAM(s) SubCutaneous every 24 hours  multivitamin 1 Tablet(s) Oral daily  QUEtiapine 25 milliGRAM(s) Oral <User Schedule>    MEDICATIONS  (PRN):  acetaminophen     Tablet .. 650 milliGRAM(s) Oral every 6 hours PRN Temp greater or equal to 38C (100.4F), Mild Pain (1 - 3)  aluminum hydroxide/magnesium hydroxide/simethicone Suspension 30 milliLiter(s) Oral every 4 hours PRN Dyspepsia  melatonin 3 milliGRAM(s) Oral at bedtime PRN Insomnia  OLANZapine 2.5 milliGRAM(s) Oral every 24 hours PRN Agitation  senna 2 Tablet(s) Oral at bedtime PRN Constipation  traMADol 50 milliGRAM(s) Oral every 6 hours PRN Moderate Pain (4 - 6)    Vital Signs Last 24 Hrs  T(F): 98 (25 Aug 2022 08:13), Max: 98 (25 Aug 2022 08:13)  HR: 79 (25 Aug 2022 08:13) (79 - 96)  BP: 105/67 (25 Aug 2022 08:13) (105/67 - 113/72)  RR: 15 (25 Aug 2022 08:13) (15 - 17)  SpO2: 98% (25 Aug 2022 08:13) (96% - 98%)  I&O's Summary      PHYSICAL EXAM:  General: NAD, Awake, alert, pleasant, laying in bed  ENT: MMM, no tonsilar exudate  Neck: Supple, No JVD  Lungs: Clear to auscultation bilaterally, no wheezes. Good air entry bilaterally   Cardio: RRR, S1/S2, No murmurs  Abdomen: Soft, Nontender, Nondistended; Bowel sounds present  Extremities: No calf tenderness, No pitting edema      LABS:                        11.8   5.89  )-----------( 224      ( 25 Aug 2022 06:20 )             36.6     08-25    139  |  101  |  28  ----------------------------<  95  3.8   |  29  |  1.01    Ca    9.6      25 Aug 2022 06:20    TPro  7.8  /  Alb  3.5  /  TBili  0.4  /  DBili  x   /  AST  17  /  ALT  19  /  AlkPhos  129  08-25                                    COVID-19 PCR: Massiel (08-17-22 @ 17:10)  COVID-19 PCR: aMsood (08-08-22 @ 20:54)      RADIOLOGY & ADDITIONAL TESTS: reviewed labs    Care Discussed with Consultants/Other Providers: discussed with Dr. Snowden

## 2022-08-25 NOTE — DISCHARGE NOTE PROVIDER - NSDCCPCAREPLAN_GEN_ALL_CORE_FT
PRINCIPAL DISCHARGE DIAGNOSIS  Diagnosis: Pelvic fracture  Assessment and Plan of Treatment: Follow up with your rehab doctor, Dr. Snowden, in 4-6 weeks for any rehab needs.      SECONDARY DISCHARGE DIAGNOSES  Diagnosis: Agitation  Assessment and Plan of Treatment: Continue taking Seroquel 25mg at 8PM as needed for agitation or poor sleep. Follow up with your primary care physician upon discharge.

## 2022-08-25 NOTE — PROGRESS NOTE ADULT - ASSESSMENT
88 yo F with no significant PMH who is s/p mechanical fall 8/9. Found to have multiple chronic appearing compression fractures. Severe collapse of L1 with mild bony retropulsion.CT pelvis: acute minimally displaced right pubic body fx with extension to Rt inferior pubic ramus. Acute minimally displaced comminuted fx of right puboacetabular junction. moderate age indeterminate compression fx of L4, worse. 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 Now admitted to [facility] rehab for functional decline.     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   - CT pelvis showed 1.7cm aneurysmal dilation of bilateral common iliac arteries.   - follow up vascular surgery as outpt    #mood disorder  - seroquel 25mg qhs    - zyprexa added PRN by rehab team if refuses seroquel    #DVT Prophylaxis:  - Lovenox

## 2022-08-25 NOTE — DISCHARGE NOTE PROVIDER - NSDCMRMEDTOKEN_GEN_ALL_CORE_FT
ibuprofen 600 mg oral tablet: 1 tab(s) orally every 6 hours  Multiple Vitamins oral gum: orally once a day   acetaminophen 325 mg oral tablet: 2 tab(s) orally every 6 hours, As needed, Temp greater or equal to 38C (100.4F), Mild Pain (1 - 3)  Multiple Vitamins oral tablet: 1 tab(s) orally once a day  QUEtiapine 25 mg oral tablet: 1 tab(s) orally once a day at 8PM, As Needed -for agitation

## 2022-08-26 ENCOUNTER — TRANSCRIPTION ENCOUNTER (OUTPATIENT)
Age: 87
End: 2022-08-26

## 2022-08-26 PROCEDURE — 99232 SBSQ HOSP IP/OBS MODERATE 35: CPT

## 2022-08-26 RX ADMIN — Medication 1 TABLET(S): at 11:14

## 2022-08-26 RX ADMIN — QUETIAPINE FUMARATE 25 MILLIGRAM(S): 200 TABLET, FILM COATED ORAL at 20:53

## 2022-08-26 RX ADMIN — OLANZAPINE 2.5 MILLIGRAM(S): 15 TABLET, FILM COATED ORAL at 00:38

## 2022-08-26 RX ADMIN — ENOXAPARIN SODIUM 40 MILLIGRAM(S): 100 INJECTION SUBCUTANEOUS at 11:14

## 2022-08-26 NOTE — PROGRESS NOTE ADULT - ATTENDING COMMENTS
Patient medically stable. Making progress towards rehab goals.   Continue rehab program. Medical input appreciated

## 2022-08-26 NOTE — DISCHARGE NOTE NURSING/CASE MANAGEMENT/SOCIAL WORK - NSDCPEFALRISK_GEN_ALL_CORE
For information on Fall & Injury Prevention, visit: [email or web address] OR  [email or web address] OR  https://www.cdc.gov/steadi/patient.html

## 2022-08-26 NOTE — PROGRESS NOTE ADULT - SUBJECTIVE AND OBJECTIVE BOX
chief complaint- Right groin/RLE pain with difficulty ambulating    This is a 88 YO female with no significant past medical history s/p mechanical fall on 8/9. Patient  was seen in ED. ct head and c-spine negative at that time. x-ray pelvis also neg. pt was discharged home and since then c/o worsening pain in the right hip when putting weight on it. Pt usually ambulates without any assistive device prior to the fall. Pt reports there is no pain at rest but pain usually worse with weight bearing and now having difficulty with ambulation. Pain usually relieves with tylenol. Pt admits to intermittent lower back pain but usually triggers by prolonged sitting. Pt denies numbness, tingling, swelling, fever, chills, n/v, abd pain, dysuria, urinary or bowel changes.    CT head today showed no acute intracranial trauma. Right frontal scalp soft tissue swelling. CT lumbar showed multiple chronic appearing compression fractures. Severe collapse of L1 with mild bony retropulsion  CT pelvis: acute minimally displaced right pubic body fx with extension to Rt inferior pubic ramus. Acute minimally displaced comminuted fx of right puboacetabular junction. moderate age indeterminate compression fx of L4, worse when compared to prior study. 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 No surgical intervention. OP vasculat f/u.     Patient was evaluated by PM&R and therapy for functional deficits, gait/ADL impairments and acute rehabilitation was recommended. Patient was medically optimized for discharge to [facility] IRU on 8/18/22.      ROS/subjective  Patient seen and assessed at bedside. No acute events overnight but Zyprexa 2.5mg IM x1 given. Patient endorsed sleeping well.   no back pain- does not follow spinal precautions  no dizziness, no headaches  no nausea, no vomiting  no SOB, No chest pain  Family training this afternoon      MEDICATIONS  (STANDING):  enoxaparin Injectable 40 milliGRAM(s) SubCutaneous every 24 hours  multivitamin 1 Tablet(s) Oral daily  QUEtiapine 25 milliGRAM(s) Oral <User Schedule>    MEDICATIONS  (PRN):  acetaminophen     Tablet .. 650 milliGRAM(s) Oral every 6 hours PRN Temp greater or equal to 38C (100.4F), Mild Pain (1 - 3)  aluminum hydroxide/magnesium hydroxide/simethicone Suspension 30 milliLiter(s) Oral every 4 hours PRN Dyspepsia  melatonin 3 milliGRAM(s) Oral at bedtime PRN Insomnia  OLANZapine 2.5 milliGRAM(s) Oral every 24 hours PRN Agitation  senna 2 Tablet(s) Oral at bedtime PRN Constipation  traMADol 50 milliGRAM(s) Oral every 6 hours PRN Moderate Pain (4 - 6)                            11.8   5.89  )-----------( 224      ( 25 Aug 2022 06:20 )             36.6     08-25    139  |  101  |  28<H>  ----------------------------<  95  3.8   |  29  |  1.01    Ca    9.6      25 Aug 2022 06:20    TPro  7.8  /  Alb  3.5  /  TBili  0.4  /  DBili  x   /  AST  17  /  ALT  19  /  AlkPhos  129<H>  08-25        CAPILLARY BLOOD GLUCOSE          Vital Signs Last 24 Hrs  T(C): 36.4 (26 Aug 2022 08:00), Max: 36.7 (25 Aug 2022 19:44)  T(F): 97.6 (26 Aug 2022 08:00), Max: 98 (25 Aug 2022 19:44)  HR: 78 (26 Aug 2022 08:00) (78 - 98)  BP: 107/71 (26 Aug 2022 08:00) (102/69 - 107/71)  BP(mean): --  RR: 14 (26 Aug 2022 08:00) (14 - 15)  SpO2: 98% (26 Aug 2022 08:00) (98% - 98%)    Parameters below as of 26 Aug 2022 08:00  Patient On (Oxygen Delivery Method): room air        Gen - NAD, Comfortable  HEENT - EOMI, MMM, right frontal scalp edema   Neck - Supple, No limited ROM  Pulm - CTAB, No wheeze, No rhonchi, No crackles  Cardiovascular - RRR, S1S2, No murmurs  Abdomen - Soft, NT/ND, +BS  Extremities - No clubbing, no cyanosis, no peripheral edema, no calf tenderness  Neuro-     Cognitive - Awake, Alert, Oriented to self, place- not time     Communication - Fluent     Attention: Intact      Memory: able to identify object, able to recall independently immediately, she needed cues to recall 2 objects 3 min later        Cranial Nerves - CN 2-12 intact     Motor -                     LEFT    UE - ShAB 5/5, EF 5/5, EE 5/5,  5/5                    RIGHT UE - ShAB 5/5, EF 5/5, EE 5/5,   5/5                    LEFT    LE - HF 4/5, KE 4/5, DF 5/5, PF 5/5                    RIGHT LE - HF 3-3+/5 in chair-  KE 4/5, DF 5/5, PF 5/5  - Able to SLR in Bed- sl pain      Sensory - Intact to LT     Reflexes - DTR Intact, No primitive reflexive     Coordination - FTN intact     Tone - normal  Psychiatric - Mood stable, Affect WNL  MSK: Kyphosis- no localized back pain on palpation  Skin:  all skin intact    IDT 8/23/22    barriers - impaired cognition  Home in Pvt house with spouse multiple levels with steps- I PTA no device    OT  CG Commode transfers  Min/mod A Dressing   CG toileting    PT  90' CG min RW  CG/min A transfers  CG/min A 12 steps    tentative DC 8/28  family training scheduled for this afternoon

## 2022-08-26 NOTE — DISCHARGE NOTE NURSING/CASE MANAGEMENT/SOCIAL WORK - PATIENT PORTAL LINK FT
You can access the FollowMyHealth Patient Portal offered by [facility] by registering at the following website: [email or web address]. By joining [facility]’s FollowMyHealth portal, you will also be able to view your health information using other applications (apps) compatible with our system.

## 2022-08-26 NOTE — DISCHARGE NOTE NURSING/CASE MANAGEMENT/SOCIAL WORK - NSDCVIVACCINE_GEN_ALL_CORE_FT
Tdap; 25-Jul-2020 13:53; Annalee Mcghee (RN); [employer]; [id number] (Exp. Date: 04-Apr-2022); IntraMuscular; Deltoid Left.; 0.5 milliLiter(s); VIS (VIS Published: 09-May-2013, VIS Presented: 25-Jul-2020);

## 2022-08-26 NOTE — PROGRESS NOTE ADULT - SUBJECTIVE AND OBJECTIVE BOX
Patient is a 87y old  Female who presents with a chief complaint of Lumbar compression fracture and pelvic fx (25 Aug 2022 15:05)    Patient seen and examined at bedside.  No events overnight. Sleepy today. Denies headache, changes in vision, chest pain or SOB. On enhanced supervision     ALLERGIES:  No Known Allergies    MEDICATIONS  (STANDING):  enoxaparin Injectable 40 milliGRAM(s) SubCutaneous every 24 hours  multivitamin 1 Tablet(s) Oral daily  QUEtiapine 25 milliGRAM(s) Oral <User Schedule>    MEDICATIONS  (PRN):  acetaminophen     Tablet .. 650 milliGRAM(s) Oral every 6 hours PRN Temp greater or equal to 38C (100.4F), Mild Pain (1 - 3)  aluminum hydroxide/magnesium hydroxide/simethicone Suspension 30 milliLiter(s) Oral every 4 hours PRN Dyspepsia  melatonin 3 milliGRAM(s) Oral at bedtime PRN Insomnia  OLANZapine 2.5 milliGRAM(s) Oral every 24 hours PRN Agitation  senna 2 Tablet(s) Oral at bedtime PRN Constipation    Vital Signs Last 24 Hrs  T(F): 97.6 (26 Aug 2022 08:00), Max: 98 (25 Aug 2022 19:44)  HR: 78 (26 Aug 2022 08:00) (78 - 98)  BP: 107/71 (26 Aug 2022 08:00) (102/69 - 107/71)  RR: 14 (26 Aug 2022 08:00) (14 - 15)  SpO2: 98% (26 Aug 2022 08:00) (98% - 98%)  I&O's Summary      PHYSICAL EXAM:  General: NAD, Awake, alert, pleasant, laying in bed  ENT: MMM, no tonsilar exudate  Neck: Supple, No JVD  Lungs: Clear to auscultation bilaterally, no wheezes. Good air entry bilaterally   Cardio: RRR, S1/S2, No murmurs  Abdomen: Soft, Nontender, Nondistended; Bowel sounds present  Extremities: No calf tenderness, No pitting edema    LABS:                        11.8   5.89  )-----------( 224      ( 25 Aug 2022 06:20 )             36.6     08-25    139  |  101  |  28  ----------------------------<  95  3.8   |  29  |  1.01    Ca    9.6      25 Aug 2022 06:20    TPro  7.8  /  Alb  3.5  /  TBili  0.4  /  DBili  x   /  AST  17  /  ALT  19  /  AlkPhos  129  08-25                                    COVID-19 PCR: NotDetec (08-25-22 @ 06:35)  COVID-19 PCR: NotDetec (08-17-22 @ 17:10)  COVID-19 PCR: NotDetec (08-08-22 @ 20:54)      RADIOLOGY & ADDITIONAL TESTS: reviewed labs    Care Discussed with Consultants/Other Providers: discussed with Dr. Snowden

## 2022-08-26 NOTE — DISCHARGE NOTE NURSING/CASE MANAGEMENT/SOCIAL WORK - NSSCTYPOFSERV_GEN_ALL_CORE
Home visit physical therapy, occupational therapy, skilled nursing, aide evaluation, social work. Note: If you do not hear from [facility] within 24-48 hours after discharge, please contact them at . Home visit physical therapy, occupational therapy, skilled nursing, aide evaluation, social work. Projected Start of Care for 9/6. Note: Please call [phone] if you do not get contacted by [facility] for your first appointment.

## 2022-08-26 NOTE — PROGRESS NOTE ADULT - ASSESSMENT
This is a 86 YO female with no significant past medical history s/p mechanical fall on 8/9. CT lumbar showed multiple chronic appearing compression fractures. Severe collapse of L1 with mild bony retropulsion.CT pelvis: acute minimally displaced right pubic body fx with extension to Rt inferior pubic ramus. Acute minimally displaced comminuted fx of right puboacetabular junction. moderate age indeterminate compression fx of L4, worse. 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 Patient now with gait Instability, ADL impairments and Functional impairments.    #Acute right pelvic fx  #Multiple lumbar compression fx. Severe collapse of L1 with mild bony retropulsion  - Comprehensive Rehab Program: PT/OT, 3hours daily and 5 days weekly  - PT: Focused on improving strength, endurance, coordination, balance, functional mobility, and transfers  - OT: Focused on improving strength, fine motor skills, coordination, posture and ADLs.    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   - CT pelvis showed 1.7cm aneurysmal dilation of bilateral common iliac arteries.   - follow up vascular surgery as outpt    Poor sleep-initially sundowning  on Seroquel to 25mg HS- timed at 8PM  D/C home with Seroquel  Enhanced supervision    #Dyspepsia  - Maalox    #Pain management  - Tylenol PRN    #Bladder management  - BS on admission, and q 8 hours (SC if > 400)  - Monitor UO  voiding well    #FEN   - Diet: Regular  - Supplements: MVI    #Skin:  - No active issues at this time  - Pressure injury/Skin: Turn Q2hrs while in bed, OOB to Chair, PT/OT     #Precaution  - Fall

## 2022-08-26 NOTE — PROGRESS NOTE ADULT - ASSESSMENT
88 yo F with no significant PMH who is s/p mechanical fall 8/9. Found to have multiple chronic appearing compression fractures. Severe collapse of L1 with mild bony retropulsion.CT pelvis: acute minimally displaced right pubic body fx with extension to Rt inferior pubic ramus. Acute minimally displaced comminuted fx of right puboacetabular junction. moderate age indeterminate compression fx of L4, worse. 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 Now admitted to [facility] rehab for functional decline.     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   - CT pelvis showed 1.7cm aneurysmal dilation of bilateral common iliac arteries.   - follow up vascular surgery as outpt    #mood disorder  - seroquel 25mg qhs    - zyprexa added PRN by rehab team if refuses seroquel  - enhanced supervision    #DVT Prophylaxis:  - Lovenox

## 2022-08-26 NOTE — DISCHARGE NOTE NURSING/CASE MANAGEMENT/SOCIAL WORK - NSDCDMETYPESERV_GEN_ALL_CORE_FT
Rolling walker (2), commode, transfer tub bench, hip kit. Note: For any issues with medical equipment, please contact [facility] at number above

## 2022-08-27 VITALS
RESPIRATION RATE: 16 BRPM | OXYGEN SATURATION: 99 % | HEART RATE: 87 BPM | SYSTOLIC BLOOD PRESSURE: 126 MMHG | DIASTOLIC BLOOD PRESSURE: 80 MMHG | TEMPERATURE: 97 F

## 2022-08-27 PROCEDURE — 99232 SBSQ HOSP IP/OBS MODERATE 35: CPT

## 2022-08-27 PROCEDURE — 99238 HOSP IP/OBS DSCHRG MGMT 30/<: CPT

## 2022-08-27 RX ADMIN — Medication 1 TABLET(S): at 11:43

## 2022-08-27 RX ADMIN — ENOXAPARIN SODIUM 40 MILLIGRAM(S): 100 INJECTION SUBCUTANEOUS at 11:43

## 2022-08-27 NOTE — PROGRESS NOTE ADULT - PROVIDER SPECIALTY LIST ADULT
Hospitalist
Physiatry
Hospitalist
Rehab Medicine
Hospitalist
Physiatry
Hospitalist
Physiatry

## 2022-08-27 NOTE — PROGRESS NOTE ADULT - SUBJECTIVE AND OBJECTIVE BOX
Cc: Gait dysfunction    Subjective:  No new issues reported by nursing staff     When seen at bedside this am, patient states that she feels well and denies any pain while seated.    Pain controlled, no chest pain, no N/V, no Fevers/Chills.  Family requesting to take patient home today         T(C): 36.2 (08-27-22 @ 07:57), Max: 36.6 (08-26-22 @ 20:58)  HR: 87 (08-27-22 @ 07:57) (72 - 87)  BP: 126/80 (08-27-22 @ 07:57) (118/68 - 126/80)  RR: 16 (08-27-22 @ 07:57) (15 - 16)  SpO2: 99% (08-27-22 @ 07:57) (98% - 99%)    In NAD, oriented to self, not to time , was able to state President   Heart- well perfused  Lungs- breathing comfortably   Abd-NT   Ext- No calf tenderness         MEDICATIONS  (STANDING):  enoxaparin Injectable 40 milliGRAM(s) SubCutaneous every 24 hours  multivitamin 1 Tablet(s) Oral daily  QUEtiapine 25 milliGRAM(s) Oral <User Schedule>    MEDICATIONS  (PRN):  acetaminophen     Tablet .. 650 milliGRAM(s) Oral every 6 hours PRN Temp greater or equal to 38C (100.4F), Mild Pain (1 - 3)  aluminum hydroxide/magnesium hydroxide/simethicone Suspension 30 milliLiter(s) Oral every 4 hours PRN Dyspepsia  melatonin 3 milliGRAM(s) Oral at bedtime PRN Insomnia  OLANZapine 2.5 milliGRAM(s) Oral every 24 hours PRN Agitation  senna 2 Tablet(s) Oral at bedtime PRN Constipation

## 2022-08-27 NOTE — PROGRESS NOTE ADULT - REASON FOR ADMISSION
Lumbar compression fracture and pelvic fx

## 2022-08-27 NOTE — PROGRESS NOTE ADULT - ASSESSMENT
86 yo F with no significant PMH who is s/p mechanical fall 8/9. Found to have multiple chronic appearing compression fractures. Severe collapse of L1 with mild bony retropulsion.CT pelvis: acute minimally displaced right pubic body fx with extension to Rt inferior pubic ramus. Acute minimally displaced comminuted fx of right puboacetabular junction. moderate age indeterminate compression fx of L4, worse. 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 Now admitted to [facility] rehab for functional decline.     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   - CT pelvis showed 1.7cm aneurysmal dilation of bilateral common iliac arteries.   - follow up vascular surgery as outpt    #mood disorder  - seroquel 25mg qhs    - zyprexa added PRN by rehab team if refuses seroquel  - enhanced supervision    #DVT Prophylaxis:  - Lovenox

## 2022-08-27 NOTE — PROGRESS NOTE ADULT - ASSESSMENT
87 year old female admitted to rehab after a fall with multiple fractures - non operative management - CT with multiple chronic appearing compression fractures. L1 with sever collapse and mild retropulsion, Pelvic fracture     Plan:  Patient scheduled for dc home in am per Primary team   No acute issues overnight   May be discharged home today and fu with PCP and PMR as outpatient

## 2022-08-27 NOTE — PROGRESS NOTE ADULT - SUBJECTIVE AND OBJECTIVE BOX
Patient is a 87y old  Female who presents with a chief complaint of Lumbar compression fracture and pelvic fx (26 Aug 2022 13:14)      Patient seen and examined at bedside.  No events overnight. Sleepy today. Denies headache, changes in vision, chest pain or SOB.    ALLERGIES:  No Known Allergies    MEDICATIONS  (STANDING):  enoxaparin Injectable 40 milliGRAM(s) SubCutaneous every 24 hours  multivitamin 1 Tablet(s) Oral daily  QUEtiapine 25 milliGRAM(s) Oral <User Schedule>    MEDICATIONS  (PRN):  acetaminophen     Tablet .. 650 milliGRAM(s) Oral every 6 hours PRN Temp greater or equal to 38C (100.4F), Mild Pain (1 - 3)  aluminum hydroxide/magnesium hydroxide/simethicone Suspension 30 milliLiter(s) Oral every 4 hours PRN Dyspepsia  melatonin 3 milliGRAM(s) Oral at bedtime PRN Insomnia  OLANZapine 2.5 milliGRAM(s) Oral every 24 hours PRN Agitation  senna 2 Tablet(s) Oral at bedtime PRN Constipation    Vital Signs Last 24 Hrs  T(F): 97.2 (27 Aug 2022 07:57), Max: 97.8 (26 Aug 2022 20:58)  HR: 87 (27 Aug 2022 07:57) (72 - 87)  BP: 126/80 (27 Aug 2022 07:57) (118/68 - 126/80)  RR: 16 (27 Aug 2022 07:57) (15 - 16)  SpO2: 99% (27 Aug 2022 07:57) (98% - 99%)  I&O's Summary        PHYSICAL EXAM:  General: NAD, Awake, alert, pleasant, sitting in chair  ENT: MMM, no tonsilar exudate  Neck: Supple, No JVD  Lungs: Clear to auscultation bilaterally, no wheezes. Good air entry bilaterally   Cardio: RRR, S1/S2, No murmurs  Abdomen: Soft, Nontender, Nondistended; Bowel sounds present  Extremities: No calf tenderness, No pitting edema    LABS:                        11.8   5.89  )-----------( 224      ( 25 Aug 2022 06:20 )             36.6     08-25    139  |  101  |  28  ----------------------------<  95  3.8   |  29  |  1.01    Ca    9.6      25 Aug 2022 06:20    TPro  7.8  /  Alb  3.5  /  TBili  0.4  /  DBili  x   /  AST  17  /  ALT  19  /  AlkPhos  129  08-25                                    COVID-19 PCR: NotDetec (08-25-22 @ 06:35)  COVID-19 PCR: NotDetec (08-17-22 @ 17:10)  COVID-19 PCR: NotDetec (08-08-22 @ 20:54)    RADIOLOGY & ADDITIONAL TESTS: reviewed labs    Care Discussed with Consultants/Other Providers:

## 2022-08-27 NOTE — PROGRESS NOTE ADULT - NUTRITIONAL ASSESSMENT
This patient has been assessed with a concern for Malnutrition and has been determined to have a diagnosis/diagnoses of Severe protein-calorie malnutrition.    This patient is being managed with:   Diet Regular-  Supplement Feeding Modality:  Oral  Ensure Enlive Cans or Servings Per Day:  1       Frequency:  Two Times a day  Entered: Aug 19 2022 12:29PM    

## 2022-09-15 PROCEDURE — 72131 CT LUMBAR SPINE W/O DYE: CPT | Mod: MA

## 2022-09-15 PROCEDURE — 97166 OT EVAL MOD COMPLEX 45 MIN: CPT

## 2022-09-15 PROCEDURE — 97116 GAIT TRAINING THERAPY: CPT

## 2022-09-15 PROCEDURE — U0005: CPT

## 2022-09-15 PROCEDURE — 72192 CT PELVIS W/O DYE: CPT | Mod: MA

## 2022-09-15 PROCEDURE — 93005 ELECTROCARDIOGRAM TRACING: CPT

## 2022-09-15 PROCEDURE — 97535 SELF CARE MNGMENT TRAINING: CPT

## 2022-09-15 PROCEDURE — 99285 EMERGENCY DEPT VISIT HI MDM: CPT | Mod: 25

## 2022-09-15 PROCEDURE — 97167 OT EVAL HIGH COMPLEX 60 MIN: CPT

## 2022-09-15 PROCEDURE — 71045 X-RAY EXAM CHEST 1 VIEW: CPT

## 2022-09-15 PROCEDURE — 97162 PT EVAL MOD COMPLEX 30 MIN: CPT

## 2022-09-15 PROCEDURE — U0003: CPT

## 2022-09-15 PROCEDURE — 97530 THERAPEUTIC ACTIVITIES: CPT

## 2022-09-15 PROCEDURE — 87635 SARS-COV-2 COVID-19 AMP PRB: CPT

## 2022-09-15 PROCEDURE — 80048 BASIC METABOLIC PNL TOTAL CA: CPT

## 2022-09-15 PROCEDURE — 85025 COMPLETE CBC W/AUTO DIFF WBC: CPT

## 2022-09-15 PROCEDURE — 36415 COLL VENOUS BLD VENIPUNCTURE: CPT

## 2022-09-15 PROCEDURE — 80053 COMPREHEN METABOLIC PANEL: CPT

## 2022-09-15 PROCEDURE — 97163 PT EVAL HIGH COMPLEX 45 MIN: CPT

## 2022-09-15 PROCEDURE — 97110 THERAPEUTIC EXERCISES: CPT

## 2022-09-15 PROCEDURE — 85027 COMPLETE CBC AUTOMATED: CPT

## 2022-09-20 ENCOUNTER — TRANSCRIPTION ENCOUNTER (OUTPATIENT)
Age: 87
End: 2022-09-20

## 2022-09-20 NOTE — DISCHARGE NOTE PROVIDER - CARE PROVIDER_API CALL
Miguel Pascual)  Orthopaedic Surgery  [address], [address]  [place] [address]  Phone: [phone]  Fax: [phone]  Follow Up Time: 2 weeks    Benjamín Daugherty)  Hematology; Internal Medicine  [address]  [place] [address]  Phone: [phone]  Fax: [phone]  Follow Up Time: 1 week    ManvarSingh, Pallavi B (MD)  Vascular Surgery  [address], [address] [place] [address]  Phone: [phone]  Fax: [phone]  Follow Up Time: 2 weeks

## 2022-09-20 NOTE — DISCHARGE NOTE PROVIDER - PROVIDER TOKENS
PROVIDER:[TOKEN:[[id number]],FOLLOWUP:[2 weeks]],PROVIDER:[TOKEN:[[id number]],FOLLOWUP:[1 week]],PROVIDER:[TOKEN:[[id number]],FOLLOWUP:[2 weeks]]

## 2022-09-20 NOTE — DISCHARGE NOTE PROVIDER - NSDCFUSCHEDAPPT_GEN_ALL_CORE_FT
Armando Snowden  [facility]  [facility] [address]  Scheduled Appointment: 09/28/2022

## 2022-09-20 NOTE — DISCHARGE NOTE PROVIDER - NSDCMRMEDTOKEN_GEN_ALL_CORE_FT
acetaminophen 325 mg oral tablet: 2 tab(s) orally every 6 hours, As needed, Temp greater or equal to 38C (100.4F), Mild Pain (1 - 3)  Multiple Vitamins oral tablet: 1 tab(s) orally once a day  QUEtiapine 25 mg oral tablet: 1 tab(s) orally once a day at 8PM, As Needed -for agitation

## 2022-09-20 NOTE — DISCHARGE NOTE PROVIDER - NSDCFUADDINST_GEN_ALL_CORE_FT
fall precautions.  further care per rehab team.  - follow up vascular surgery as outpt  - obtain MRI spine as outpatient.

## 2022-09-20 NOTE — DISCHARGE NOTE PROVIDER - NSDCCPCAREPLAN_GEN_ALL_CORE_FT
PRINCIPAL DISCHARGE DIAGNOSIS  Diagnosis: Pelvic fracture  Assessment and Plan of Treatment:       SECONDARY DISCHARGE DIAGNOSES  Diagnosis: Vertebral compression fracture  Assessment and Plan of Treatment:     Diagnosis: Compression fracture of lumbar vertebra, unspecified lumbar vertebral level, initial encounter  Assessment and Plan of Treatment:

## 2022-09-20 NOTE — DISCHARGE NOTE PROVIDER - CARE PROVIDERS DIRECT ADDRESSES
[email or web address]

## 2022-09-20 NOTE — DISCHARGE NOTE PROVIDER - HOSPITAL COURSE
87y f with no significant past medical history s/p mechanical fall last tuesday now presents to ED c/o worsening right hip pain and ambulatory difficulty.     #Acute right pelvic fx  #Multiple lumbar compression fx. 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   - CT pelvis showed 1.7cm aneurysmal dilation of bilateral common iliac arteries.   - follow up vascular surgery as outpt    dvt ppx : lovenox subq    updated pt's daughter by bedside.   d/w ortho team - no procedure suggested as inpatient.    CT pelvis:  MISCELLANEOUS:  There is a aneurysmal dilatation of the infrarenal   abdominal aorta measuring 3.6 cm in diameter. This is slightly more   prominent as compared to the prior CT.    IMPRESSION:    1. Multiple chronic appearing compression fracture deformities inclusive   of T12-L4. Most notable is severe collapse of L1 with mild bony   retropulsion. There is increasing loss of height of multiple vertebrae as   compared to the prior study. See above.  2. Follow-up MR imaging of the lumbar spine recommended for further   assessment.    Patient accepted for further care by acute rehab team.

## 2022-09-28 ENCOUNTER — APPOINTMENT (OUTPATIENT)
Dept: PHYSICAL MEDICINE AND REHAB | Facility: CLINIC | Age: 87
End: 2022-09-28

## 2022-09-28 ENCOUNTER — NON-APPOINTMENT (OUTPATIENT)
Age: 87
End: 2022-09-28

## 2022-09-28 VITALS
OXYGEN SATURATION: 99 % | HEIGHT: 64 IN | SYSTOLIC BLOOD PRESSURE: 120 MMHG | HEART RATE: 76 BPM | DIASTOLIC BLOOD PRESSURE: 79 MMHG | TEMPERATURE: 97.8 F

## 2022-09-28 DIAGNOSIS — S32.000S WEDGE COMPRESSION FRACTURE OF UNSPECIFIED LUMBAR VERTEBRA, SEQUELA: ICD-10-CM

## 2022-09-28 DIAGNOSIS — S32.9XXS FRACTURE OF UNSPECIFIED PARTS OF LUMBOSACRAL SPINE AND PELVIS, SEQUELA: ICD-10-CM

## 2022-09-28 PROCEDURE — 99212 OFFICE O/P EST SF 10 MIN: CPT

## 2022-09-28 NOTE — PHYSICAL EXAM
[FreeTextEntry1] : General: NAD, Resting Comfortable,                                \par HEENT: NC/AT, EOM I\par Cardio: RRR, Normal S1-S2, No M/G/R                            \par Pulm: No Respiratory Distress,  Lungs CTAB                      \par Abdomen: ND/NT, Soft, BS+                                              \par MSK: No joint swelling, Full ROM                                       \par Ext: No edema, No calf tenderness \par \par Neurological Examination  \par Cognitive: AAO x 3                                                                       \par CN II - XII  intact                                                                        \par Sensory: Intact to light touch                                                                                        \par Tone: normal Throughout \par \par \par Motor  \par LEFT    UE: SF [5/5], EF [5/5], EE [5/5], WE [5/5],  [wnl]\par RIGHT UE: SF [5/5], EF [5/5], EE [5/5], WE [5/5],  [wnl]\par LEFT    LE:  HF [5/5], KE [5/5], DF [5/5], EHL [5/5],  PF [5/5]\par RIGHT LE:  HF [5/5], KE [5/5], DF [5/5], EHL [5/5],  PF [5/5]  \par \par Reflex:  intact\par \par \par transfers and ambulates independently- good balance

## 2022-09-28 NOTE — HISTORY OF PRESENT ILLNESS
[FreeTextEntry1] : This is a 88 YO female with no significant past medical history s/p mechanical fall on 8/9. Patient  was seen in ED. ct head and c-spine negative at that time. x-ray pelvis also neg. pt was discharged home and since then c/o worsening pain in the right hip when putting weight on it. Pt usually ambulates without any assistive device prior to the fall. Pt reports there is no pain at rest but pain usually worse with weight bearing and now having difficulty with ambulation. Pain usually relieves with tylenol. Pt admits to intermittent lower back pain but usually triggers by prolonged sitting. Pt denies numbness, tingling, swelling, fever, chills, n/v, abd pain, dysuria, urinary or bowel changes.\par \par CT head t showed no acute intracranial trauma. Right frontal scalp soft tissue swelling. CT lumbar showed multiple chronic appearing compression fractures. Severe collapse of L1 with mild bony retropulsion\par CT pelvis: acute minimally displaced right pubic body fx with extension to Rt inferior pubic ramus. Acute minimally displaced comminuted fx of right puboacetabular junction. moderate age indeterminate compression fx of L4, worse when compared to prior study. 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 No surgical intervention.\par \par Patient was evaluated by PM&R and therapy for functional deficits, gait/ADL impairments and acute rehabilitation was recommended. Patient was medically optimized for discharge to [facility] IRU on 8/18/22.- patient did well with Rehab and was Dcd home on 8/27 . She is seen today on Follow up\par After returning home the patient received VN services foe few weeks then advanced to independent ambulation without device\par She has not seen a PMD - She usually goes once a year for check up- Currently the patient is on no medications\par She has no Right hip/groin pain but occasionally suffers from some low back pain rlated to her compression fractures relieved with lei hernandez and occasional tylenol

## 2022-09-28 NOTE — SOCIAL HISTORY
[Spouse/Partner] : spouse/partner [Private Home] : in a private home [Stairs to enter?] : stairs to enter [Stairs inside the home?] : stairs inside the home [No Device Needed] : Patient doesn't use a device for ambulation

## 2022-10-06 ENCOUNTER — NON-APPOINTMENT (OUTPATIENT)
Age: 87
End: 2022-10-06

## 2023-01-31 ENCOUNTER — NON-APPOINTMENT (OUTPATIENT)
Age: 88
End: 2023-01-31

## 2023-04-24 ENCOUNTER — APPOINTMENT (OUTPATIENT)
Dept: VASCULAR SURGERY | Facility: CLINIC | Age: 88
End: 2023-04-24
Payer: MEDICARE

## 2023-04-24 PROCEDURE — 99212 OFFICE O/P EST SF 10 MIN: CPT

## 2023-04-24 PROCEDURE — 93978 VASCULAR STUDY: CPT

## 2023-04-24 NOTE — HISTORY OF PRESENT ILLNESS
[FreeTextEntry1] : Patient and son present for routine reevaluation after undergoing an aortic duplex study today in my office.  This study demonstrated a 4.2 cm abdominal aortic aneurysm.\par \par The patient denies abdominal or back pain.

## 2023-04-24 NOTE — ASSESSMENT
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  I noted the results of the aortic duplex study to the patient and her son and suggested continued surveillance in 1 year.  All questions were answered.

## 2023-10-27 ENCOUNTER — NON-APPOINTMENT (OUTPATIENT)
Age: 88
End: 2023-10-27

## 2023-10-28 ENCOUNTER — APPOINTMENT (OUTPATIENT)
Dept: RADIOLOGY | Facility: CLINIC | Age: 88
End: 2023-10-28
Payer: MEDICARE

## 2023-10-28 ENCOUNTER — OUTPATIENT (OUTPATIENT)
Dept: OUTPATIENT SERVICES | Facility: HOSPITAL | Age: 88
LOS: 1 days | End: 2023-10-28
Payer: MEDICARE

## 2023-10-28 DIAGNOSIS — R07.81 PLEURODYNIA: ICD-10-CM

## 2023-10-28 PROCEDURE — 71100 X-RAY EXAM RIBS UNI 2 VIEWS: CPT

## 2023-10-28 PROCEDURE — 71100 X-RAY EXAM RIBS UNI 2 VIEWS: CPT | Mod: 26,LT

## 2023-12-21 ENCOUNTER — NON-APPOINTMENT (OUTPATIENT)
Age: 88
End: 2023-12-21

## 2024-04-24 ENCOUNTER — APPOINTMENT (OUTPATIENT)
Dept: VASCULAR SURGERY | Facility: CLINIC | Age: 89
End: 2024-04-24
Payer: MEDICARE

## 2024-04-24 VITALS
SYSTOLIC BLOOD PRESSURE: 114 MMHG | HEART RATE: 74 BPM | HEIGHT: 64 IN | DIASTOLIC BLOOD PRESSURE: 81 MMHG | BODY MASS INDEX: 24.75 KG/M2 | OXYGEN SATURATION: 98 % | WEIGHT: 145 LBS

## 2024-04-24 DIAGNOSIS — I71.43 INFRARENAL ABDOMINAL AORTIC ANEURYSM, WITHOUT RUPTURE: ICD-10-CM

## 2024-04-24 PROCEDURE — 93978 VASCULAR STUDY: CPT

## 2024-04-24 PROCEDURE — 99214 OFFICE O/P EST MOD 30 MIN: CPT

## 2024-04-24 NOTE — PHYSICAL EXAM
[JVD] : no jugular venous distention  [Normal Breath Sounds] : Normal breath sounds [Normal Heart Sounds] : normal heart sounds [2+] : left 2+ [1+] : left 1+ [Ankle Swelling (On Exam)] : not present [de-identified] : well appearing elderly [FreeTextEntry1] : widened right pop pulse no abominal pulsatile mass no abdominal tenderness

## 2024-04-24 NOTE — HISTORY OF PRESENT ILLNESS
[FreeTextEntry1] : 88-year-old, prior patient of Dr. 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  [de-identified] : Patient seen today for surveillance duplex of abdominal aortic aneurysm. States she's doing well, denies any belly or back pain. She denies any surveillance of lower extremity anyurysms. No issues with BP and no COPD or smoking history. Slightly confused and provides history with help of son.

## 2024-04-24 NOTE — ASSESSMENT
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 Argentina Miles) for BUN/Cr  -discussed importance of adequate BP control -f/u 1 month with lower extremity duplex to screen for pop aneurysms since patient has never been checked.

## 2024-05-06 ENCOUNTER — OUTPATIENT (OUTPATIENT)
Dept: OUTPATIENT SERVICES | Facility: HOSPITAL | Age: 89
LOS: 1 days | End: 2024-05-06
Payer: MEDICARE

## 2024-05-06 ENCOUNTER — APPOINTMENT (OUTPATIENT)
Dept: CT IMAGING | Facility: CLINIC | Age: 89
End: 2024-05-06
Payer: MEDICARE

## 2024-05-06 DIAGNOSIS — I71.43 INFRARENAL ABDOMINAL AORTIC ANEURYSM, WITHOUT RUPTURE: ICD-10-CM

## 2024-05-06 PROCEDURE — 74174 CTA ABD&PLVS W/CONTRAST: CPT | Mod: 26,MH

## 2024-05-06 PROCEDURE — 74174 CTA ABD&PLVS W/CONTRAST: CPT

## 2024-05-07 ENCOUNTER — NON-APPOINTMENT (OUTPATIENT)
Age: 89
End: 2024-05-07

## 2024-05-20 ENCOUNTER — APPOINTMENT (OUTPATIENT)
Dept: VASCULAR SURGERY | Facility: CLINIC | Age: 89
End: 2024-05-20
Payer: MEDICARE

## 2024-05-20 VITALS
DIASTOLIC BLOOD PRESSURE: 66 MMHG | SYSTOLIC BLOOD PRESSURE: 92 MMHG | OXYGEN SATURATION: 99 % | HEART RATE: 68 BPM | RESPIRATION RATE: 18 BRPM

## 2024-05-20 PROCEDURE — 99212 OFFICE O/P EST SF 10 MIN: CPT

## 2024-05-20 PROCEDURE — 93925 LOWER EXTREMITY STUDY: CPT

## 2024-06-05 NOTE — ASSESSMENT
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

## 2024-06-05 NOTE — HISTORY OF PRESENT ILLNESS
[FreeTextEntry1] : 88-year-old, prior patient of Dr. 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   Interval History: Patient seen today for surveillance duplex of abdominal aortic aneurysm. States she's doing well, denies any belly or back pain. She denies any surveillance of lower extremity anyurysms. No issues with BP and no COPD or smoking history. Slightly confused and provides history with help of son. [de-identified] : Patient presents today to f/u with duplex to screen for pop aneurysms since patient has never been checked. She feels well, denies belly or back pain. No complaints.

## 2024-06-26 ENCOUNTER — NON-APPOINTMENT (OUTPATIENT)
Age: 89
End: 2024-06-26

## 2024-06-27 ENCOUNTER — RESULT REVIEW (OUTPATIENT)
Age: 89
End: 2024-06-27

## 2025-01-03 ENCOUNTER — NON-APPOINTMENT (OUTPATIENT)
Age: 89
End: 2025-01-03

## 2025-02-03 NOTE — ED ADULT TRIAGE NOTE - PATIENT ON (OXYGEN DELIVERY METHOD)
Subjective:       Patient ID: Christina Foster is a 60 y.o. female.    Chief Complaint: hypercoaguable    HPI: Ms. Foster is a 60 year old female who is following up for her hx of unprovoked pulmonary embolus. She denies any family hx of clots. No evidence of DVT at the time. Hypercoagulable workup is negative.  Clot Hx: admitted 3/27/24 with bilateral pulmonary emboli and left lower lobe infarct. She was placed on supplemental oxygen in the ER due to sats being less than 89%.   Pmhx: ESRD on dialysis.     Today: She states she has been well. She continues to take eliquis 5mg BID without issues.  She denies any other bleeding, n/v/d/c, fevers, illnesses, sob. She states her mid and lower back have been hurting for for about 2 weeks. She has not taken anything due to being on dialysis. She denies any falls, trauma to the area, pain with urination, frequency, sob, cp.     Social History     Socioeconomic History    Marital status: Single   Occupational History    Occupation: Dietary cook    Tobacco Use    Smoking status: Former     Average packs/day: 0.1 packs/day for 7.0 years (0.7 ttl pk-yrs)     Types: Cigarettes     Start date: 6/12/2014     Quit date: 6/12/2021     Years since quitting: 3.6     Passive exposure: Current    Smokeless tobacco: Never   Substance and Sexual Activity    Alcohol use: Yes     Comment: 1 glass of wine every 6 months    Drug use: Never    Sexual activity: Not Currently     Social Drivers of Health     Financial Resource Strain: Low Risk  (8/12/2022)    Overall Financial Resource Strain (CARDIA)     Difficulty of Paying Living Expenses: Not hard at all   Recent Concern: Financial Resource Strain - High Risk (6/29/2022)    Overall Financial Resource Strain (CARDIA)     Difficulty of Paying Living Expenses: Very hard   Food Insecurity: Food Insecurity Present (3/28/2024)    Hunger Vital Sign     Worried About Running Out of Food in the Last Year: Sometimes true     Ran Out of Food in the  Last Year: Sometimes true   Transportation Needs: No Transportation Needs (3/28/2024)    PRAPARE - Transportation     Lack of Transportation (Medical): No     Lack of Transportation (Non-Medical): No   Physical Activity: Insufficiently Active (6/29/2022)    Exercise Vital Sign     Days of Exercise per Week: 7 days     Minutes of Exercise per Session: 10 min   Stress: Stress Concern Present (8/12/2022)    [employer] - Occupational Stress Questionnaire     Feeling of Stress : Rather much   Housing Stability: Unknown (3/28/2024)    Housing Stability Vital Sign     Unable to Pay for Housing in the Last Year: No     Unstable Housing in the Last Year: No       Past Medical History:   Diagnosis Date    Diabetes mellitus     Dialysis patient     Disorder of kidney and ureter     Glaucoma     Hepatitis C antibody positive in blood 04/12/2023    RNA POSITIVE    Hypertension        Family History   Problem Relation Name Age of Onset    Hypertension Mother      Diabetes Mother      Heart failure Mother      No Known Problems Father      Breast cancer Maternal Grandmother      Cancer Maternal Aunt         Past Surgical History:   Procedure Laterality Date    CATARACT EXTRACTION Right 09/07/2023    CATARACT EXTRACTION Left 10/05/2023    DIALYSIS FISTULA CREATION      HAND SURGERY      TUBAL LIGATION         Review of Systems   Constitutional:  Negative for activity change, appetite change, chills, diaphoresis, fatigue, fever and unexpected weight change.   HENT:  Negative for nosebleeds.    Respiratory:  Negative for cough and shortness of breath.    Cardiovascular:  Negative for chest pain and leg swelling.   Gastrointestinal:  Negative for anal bleeding, blood in stool, constipation, diarrhea, nausea and vomiting.   Genitourinary:  Negative for hematuria.   Musculoskeletal:  Positive for arthralgias and back pain.         Medication List with Changes/Refills   New Medications    METHOCARBAMOL  (ROBAXIN) 500 MG TAB    Take 1 tablet (500 mg total) by mouth 2 (two) times daily as needed (muscle spasms).   Current Medications    ALBUTEROL (VENTOLIN HFA) 90 MCG/ACTUATION INHALER    Inhale 2 puffs into the lungs every 6 (six) hours as needed for Wheezing or Shortness of Breath. Rescue    AMITRIPTYLINE (ELAVIL) 25 MG TABLET    Take 1 tablet (25 mg total) by mouth every evening.    BETAMETHASONE DIPROPIONATE 0.05 % CREAM    APPLY TOPICALLY ONCE DAILY.    CETIRIZINE (ZYRTEC) 5 MG TABLET    Take 1 tablet (5 mg total) by mouth once daily.    CINACALCET HCL (SENSIPAR ORAL)    Take 30 mg by mouth.    CLOTRIMAZOLE (LOTRIMIN) 1 % CREAM    Apply topically.    DOCUSATE SODIUM (COLACE) 100 MG CAPSULE    Take 1 capsule (100 mg total) by mouth 2 (two) times daily as needed for Constipation (constipation).    DOXYCYCLINE (VIBRA-TABS) 100 MG TABLET    Take 1 tablet by mouth.    FERRIC CITRATE (AURYXIA) 210 MG IRON TAB    Take 4 tablets by mouth 3 (three) times daily with meals.    HYDROCODONE-ACETAMINOPHEN (NORCO) 5-325 MG PER TABLET    Take 1 tablet by mouth every 8 (eight) hours as needed for Pain.    HYDROCORTISONE 2.5 % CREAM    Apply topically 2 (two) times daily.    MIDODRINE (PROAMATINE) 5 MG TAB    Take 1 tablet by mouth 3 (three) times daily with meals.    TIMOLOL MALEATE 0.5% (TIMOPTIC) 0.5 % DROP    INSTILL 1 DROP INTO EACH EYE IN THE MORNING   Changed and/or Refilled Medications    Modified Medication Previous Medication    APIXABAN (ELIQUIS) 5 MG TAB apixaban (ELIQUIS) 5 mg Tab       Take 1 tablet (5 mg total) by mouth 2 (two) times daily.    Take 1 tablet (5 mg total) by mouth 2 (two) times daily.     Objective:     Vitals:    02/03/25 1224   BP: (!) 106/56   Pulse: 68   Temp: 98.5 °F (36.9 °C)     Physical Exam  Vitals reviewed.   Constitutional:       General: She is not in acute distress.     Appearance: She is not ill-appearing, toxic-appearing or diaphoretic.   HENT:      Head: Normocephalic and atraumatic.    Cardiovascular:      Rate and Rhythm: Normal rate.   Musculoskeletal:      Right lower leg: No edema.      Left lower leg: No edema.   Skin:     General: Skin is warm.      Coloration: Skin is not jaundiced or pale.      Findings: No bruising, erythema, lesion or rash.   Neurological:      Mental Status: She is alert.   Psychiatric:         Mood and Affect: Mood normal.         Behavior: Behavior normal.         Thought Content: Thought content normal.          Labs/Results:  Lab Results   Component Value Date    WBC 7.25 01/29/2025    RBC 5.17 01/29/2025    HGB 14.4 01/29/2025    HCT 46.3 01/29/2025    MCV 90 01/29/2025    MCH 27.9 01/29/2025    MCHC 31.1 (L) 01/29/2025    RDW 13.3 01/29/2025     01/29/2025    MPV 9.3 01/29/2025    GRAN 3.8 01/29/2025    GRAN 52.5 01/29/2025    LYMPH 2.6 01/29/2025    LYMPH 35.2 01/29/2025    MONO 0.6 01/29/2025    MONO 8.1 01/29/2025    EOS 0.2 01/29/2025    BASO 0.04 01/29/2025    EOSINOPHIL 3.0 01/29/2025    BASOPHIL 0.6 01/29/2025      Latest Reference Range & Units 01/29/25 13:40   Iron 30 - 160 ug/dL 94   TIBC 250 - 450 ug/dL 323   Saturated Iron 20 - 50 % 29   Transferrin 200 - 375 mg/dL 218   Ferritin 20.0 - 300.0 ng/mL 1,035 (H)     CMP  Sodium   Date Value Ref Range Status   01/29/2025 140 136 - 145 mmol/L Final     Potassium   Date Value Ref Range Status   01/29/2025 3.6 3.5 - 5.1 mmol/L Final     Chloride   Date Value Ref Range Status   01/29/2025 97 95 - 110 mmol/L Final     CO2   Date Value Ref Range Status   01/29/2025 31 (H) 23 - 29 mmol/L Final     Glucose   Date Value Ref Range Status   01/29/2025 100 70 - 110 mg/dL Final     BUN   Date Value Ref Range Status   01/29/2025 21 (H) 6 - 20 mg/dL Final     Creatinine   Date Value Ref Range Status   01/29/2025 7.4 (H) 0.5 - 1.4 mg/dL Final     Calcium   Date Value Ref Range Status   01/29/2025 10.3 8.7 - 10.5 mg/dL Final     Total Protein   Date Value Ref Range Status   01/29/2025 8.4 6.0 - 8.4 g/dL Final      Albumin   Date Value Ref Range Status   01/29/2025 3.9 3.5 - 5.2 g/dL Final     Total Bilirubin   Date Value Ref Range Status   01/29/2025 0.5 0.1 - 1.0 mg/dL Final     Comment:     For infants and newborns, interpretation of results should be based  on gestational age, weight and in agreement with clinical  observations.    Premature Infant recommended reference ranges:  Up to 24 hours.............<8.0 mg/dL  Up to 48 hours............<12.0 mg/dL  3-5 days..................<15.0 mg/dL  6-29 days.................<15.0 mg/dL       Alkaline Phosphatase   Date Value Ref Range Status   01/29/2025 58 40 - 150 U/L Final     AST   Date Value Ref Range Status   01/29/2025 10 10 - 40 U/L Final     ALT   Date Value Ref Range Status   01/29/2025 11 10 - 44 U/L Final     Anion Gap   Date Value Ref Range Status   01/29/2025 12 8 - 16 mmol/L Final     eGFR   Date Value Ref Range Status   01/29/2025 6 (A) >60 mL/min/1.73 m^2 Final      Latest Reference Range & Units 01/29/25 13:40   Lactate Dehydrogenase 110 - 260 U/L 179      Latest Reference Range & Units 05/28/24 14:39   D-Dimer <0.50 mg/L FEU 0.26   ANTICARDIOLIPIN IGA <14.0 APL 1.5   APA Isotype IgG 0.00 - 14.99 GPL <9.40   APA Isotype IgM 0.00 - 12.49 MPL <9.40   Beta-2 Glyco 1 IgA <7.0 U/mL 1.5   Beta-2 Glyco 1 IgG <7.0 U/mL 0.8   Beta-2 Glyco 1 IgM <7.0 U/mL <2.4   dRVVT Confirm <=1.20  1.05   DRVVT Screen <=1.20  1.51 (H)   Factor V Leiden  Negative   Hex Phosph Neut Test <=7.9 s Not Performed   Protein C Activity 70 - 150 % 97   Protein S Activity 65 - 150 % 134   Thrombin Time <=19.5 s Not Performed   Antithrombin III 83 - 118 % 101   Lupus Anticoagulant Interpretation  See Note   PT Lupus Anticoagulant 12.0 - 15.5 s 13.3   PTT Heparin Neutralized <=1.20  Not Performed      Latest Reference Range & Units 01/29/25 13:40   [place] Free Light Chains 0.33 - 1.94 mg/dL 24.51 (H)   Lambda Free Light Chains 0.57 - 2.63 mg/dL 21.37 (H)   Kappa/Lambda FLC Ratio 0.26 - 1.65   1.15     Assessment:     Problem List Items Addressed This Visit       History of pulmonary embolus (PE) - Primary    Relevant Orders    CBC Auto Differential    Comprehensive Metabolic Panel    Protein Electrophoresis, Serum    Immunoglobulin Free LT Chains Blood    Lactate Dehydrogenase    Immunofixation Electrophoresis     Other Visit Diagnoses       Morbid (severe) obesity due to excess calories        Relevant Medications    methocarbamoL (ROBAXIN) 500 MG Tab    Pulmonary embolism, unspecified chronicity, unspecified pulmonary embolism type, unspecified whether acute cor pulmonale present        Relevant Medications    apixaban (ELIQUIS) 5 mg Tab    Back pain, unspecified back location, unspecified back pain laterality, unspecified chronicity        Relevant Medications    methocarbamoL (ROBAXIN) 500 MG Tab          Plan:     Chronic saddle pulmonary embolism without acute cor pulmonale  --unprovoked PE  --continue with eliquis 5mg BID  --no evidence of DVT at time time  --hypercoagulable workup is negative    Follow-Up: 9 months with cbc cmp spep annette light chains prior     Dianne Vargas PA-C  Hematology Oncology    Route Chart for Scheduling    Med Onc Chart Routing      Follow up with physician    Follow up with JASON . 9 months with cbc cmp spep annette light chains prior -vv ok   Infusion scheduling note    Injection scheduling note    Labs CMP, CBC, free light chains, SPEP and other   Scheduling:  Preferred lab:  Lab interval:     Imaging    Pharmacy appointment    Other referrals                          room air

## 2025-02-03 NOTE — ED PROVIDER NOTE - IV ALTEPLASE DOOR HIDDEN

## 2025-05-19 ENCOUNTER — APPOINTMENT (OUTPATIENT)
Dept: VASCULAR SURGERY | Facility: CLINIC | Age: 89
End: 2025-05-19

## 2025-05-19 ENCOUNTER — APPOINTMENT (OUTPATIENT)
Dept: VASCULAR SURGERY | Facility: CLINIC | Age: 89
End: 2025-05-19
Payer: MEDICARE

## 2025-05-19 VITALS
DIASTOLIC BLOOD PRESSURE: 76 MMHG | HEART RATE: 82 BPM | SYSTOLIC BLOOD PRESSURE: 103 MMHG | BODY MASS INDEX: 23.9 KG/M2 | HEIGHT: 64 IN | WEIGHT: 140 LBS | OXYGEN SATURATION: 100 %

## 2025-05-19 DIAGNOSIS — I71.43 INFRARENAL ABDOMINAL AORTIC ANEURYSM, WITHOUT RUPTURE: ICD-10-CM

## 2025-05-19 PROCEDURE — 99213 OFFICE O/P EST LOW 20 MIN: CPT

## 2025-05-19 PROCEDURE — 93978 VASCULAR STUDY: CPT

## 2025-05-30 ENCOUNTER — OUTPATIENT (OUTPATIENT)
Dept: OUTPATIENT SERVICES | Facility: HOSPITAL | Age: 89
LOS: 1 days | End: 2025-05-30
Payer: MEDICARE

## 2025-05-30 ENCOUNTER — APPOINTMENT (OUTPATIENT)
Dept: CT IMAGING | Facility: CLINIC | Age: 89
End: 2025-05-30
Payer: MEDICARE

## 2025-05-30 DIAGNOSIS — I71.43 INFRARENAL ABDOMINAL AORTIC ANEURYSM, WITHOUT RUPTURE: ICD-10-CM

## 2025-05-30 PROCEDURE — 74174 CTA ABD&PLVS W/CONTRAST: CPT

## 2025-05-30 PROCEDURE — 74174 CTA ABD&PLVS W/CONTRAST: CPT | Mod: 26

## 2025-06-23 ENCOUNTER — APPOINTMENT (OUTPATIENT)
Dept: VASCULAR SURGERY | Facility: CLINIC | Age: 89
End: 2025-06-23
Payer: MEDICARE

## 2025-06-23 VITALS — DIASTOLIC BLOOD PRESSURE: 69 MMHG | SYSTOLIC BLOOD PRESSURE: 99 MMHG | HEART RATE: 92 BPM | OXYGEN SATURATION: 94 %

## 2025-06-23 PROBLEM — F03.A0 MILD DEMENTIA, UNSPECIFIED DEMENTIA TYPE, UNSPECIFIED WHETHER BEHAVIORAL, PSYCHOTIC, OR MOOD DISTURBANCE OR ANXIETY: Status: ACTIVE | Noted: 2025-06-23

## 2025-06-23 PROCEDURE — 99213 OFFICE O/P EST LOW 20 MIN: CPT

## 2025-08-19 ENCOUNTER — EMERGENCY (EMERGENCY)
Facility: HOSPITAL | Age: 89
LOS: 0 days | Discharge: HOME | End: 2025-08-19
Attending: EMERGENCY MEDICINE
Payer: MEDICARE

## 2025-08-19 VITALS
TEMPERATURE: 98 F | HEART RATE: 70 BPM | DIASTOLIC BLOOD PRESSURE: 68 MMHG | SYSTOLIC BLOOD PRESSURE: 105 MMHG | RESPIRATION RATE: 15 BRPM | OXYGEN SATURATION: 100 %

## 2025-08-19 VITALS
DIASTOLIC BLOOD PRESSURE: 78 MMHG | TEMPERATURE: 98 F | SYSTOLIC BLOOD PRESSURE: 103 MMHG | RESPIRATION RATE: 16 BRPM | HEART RATE: 82 BPM | OXYGEN SATURATION: 98 %

## 2025-08-19 DIAGNOSIS — R40.4 TRANSIENT ALTERATION OF AWARENESS: ICD-10-CM

## 2025-08-19 LAB
ALBUMIN SERPL ELPH-MCNC: 3.2 G/DL — LOW (ref 3.3–5)
ALP SERPL-CCNC: 84 U/L — SIGNIFICANT CHANGE UP (ref 40–120)
ALT FLD-CCNC: 17 U/L — SIGNIFICANT CHANGE UP (ref 12–78)
ANION GAP SERPL CALC-SCNC: 6 MMOL/L — SIGNIFICANT CHANGE UP (ref 5–17)
AST SERPL-CCNC: 19 U/L — SIGNIFICANT CHANGE UP (ref 15–37)
BASOPHILS # BLD AUTO: 0.05 K/UL — SIGNIFICANT CHANGE UP (ref 0–0.2)
BASOPHILS NFR BLD AUTO: 0.9 % — SIGNIFICANT CHANGE UP (ref 0–2)
BILIRUB SERPL-MCNC: 0.5 MG/DL — SIGNIFICANT CHANGE UP (ref 0.2–1.2)
BUN SERPL-MCNC: 16 MG/DL — SIGNIFICANT CHANGE UP (ref 7–23)
CALCIUM SERPL-MCNC: 9.2 MG/DL — SIGNIFICANT CHANGE UP (ref 8.5–10.1)
CHLORIDE SERPL-SCNC: 107 MMOL/L — SIGNIFICANT CHANGE UP (ref 96–108)
CO2 SERPL-SCNC: 23 MMOL/L — SIGNIFICANT CHANGE UP (ref 22–31)
CREAT SERPL-MCNC: 0.88 MG/DL — SIGNIFICANT CHANGE UP (ref 0.5–1.3)
EGFR: 63 ML/MIN/1.73M2 — SIGNIFICANT CHANGE UP
EGFR: 63 ML/MIN/1.73M2 — SIGNIFICANT CHANGE UP
EOSINOPHIL # BLD AUTO: 0.17 K/UL — SIGNIFICANT CHANGE UP (ref 0–0.5)
EOSINOPHIL NFR BLD AUTO: 3.2 % — SIGNIFICANT CHANGE UP (ref 0–6)
GLUCOSE SERPL-MCNC: 118 MG/DL — HIGH (ref 70–99)
HCT VFR BLD CALC: 35.7 % — SIGNIFICANT CHANGE UP (ref 34.5–45)
HGB BLD-MCNC: 11.3 G/DL — LOW (ref 11.5–15.5)
IMM GRANULOCYTES # BLD AUTO: 0.01 K/UL — SIGNIFICANT CHANGE UP (ref 0–0.07)
IMM GRANULOCYTES NFR BLD AUTO: 0.2 % — SIGNIFICANT CHANGE UP (ref 0–0.9)
LYMPHOCYTES # BLD AUTO: 1.13 K/UL — SIGNIFICANT CHANGE UP (ref 1–3.3)
LYMPHOCYTES NFR BLD AUTO: 21.2 % — SIGNIFICANT CHANGE UP (ref 13–44)
MAGNESIUM SERPL-MCNC: 2 MG/DL — SIGNIFICANT CHANGE UP (ref 1.6–2.6)
MCHC RBC-ENTMCNC: 29.7 PG — SIGNIFICANT CHANGE UP (ref 27–34)
MCHC RBC-ENTMCNC: 31.7 G/DL — LOW (ref 32–36)
MCV RBC AUTO: 93.9 FL — SIGNIFICANT CHANGE UP (ref 80–100)
MONOCYTES # BLD AUTO: 0.35 K/UL — SIGNIFICANT CHANGE UP (ref 0–0.9)
MONOCYTES NFR BLD AUTO: 6.6 % — SIGNIFICANT CHANGE UP (ref 2–14)
NEUTROPHILS # BLD AUTO: 3.62 K/UL — SIGNIFICANT CHANGE UP (ref 1.8–7.4)
NEUTROPHILS NFR BLD AUTO: 67.9 % — SIGNIFICANT CHANGE UP (ref 43–77)
NRBC # BLD AUTO: 0 K/UL — SIGNIFICANT CHANGE UP (ref 0–0)
NRBC # FLD: 0 K/UL — SIGNIFICANT CHANGE UP (ref 0–0)
NRBC BLD AUTO-RTO: 0 /100 WBCS — SIGNIFICANT CHANGE UP (ref 0–0)
PLATELET # BLD AUTO: 146 K/UL — LOW (ref 150–400)
PMV BLD: 9.6 FL — SIGNIFICANT CHANGE UP (ref 7–13)
POTASSIUM SERPL-MCNC: 4 MMOL/L — SIGNIFICANT CHANGE UP (ref 3.5–5.3)
POTASSIUM SERPL-SCNC: 4 MMOL/L — SIGNIFICANT CHANGE UP (ref 3.5–5.3)
PROT SERPL-MCNC: 7 GM/DL — SIGNIFICANT CHANGE UP (ref 6–8.3)
RBC # BLD: 3.8 M/UL — SIGNIFICANT CHANGE UP (ref 3.8–5.2)
RBC # FLD: 13.8 % — SIGNIFICANT CHANGE UP (ref 10.3–14.5)
SODIUM SERPL-SCNC: 136 MMOL/L — SIGNIFICANT CHANGE UP (ref 135–145)
WBC # BLD: 5.33 K/UL — SIGNIFICANT CHANGE UP (ref 3.8–10.5)
WBC # FLD AUTO: 5.33 K/UL — SIGNIFICANT CHANGE UP (ref 3.8–10.5)

## 2025-08-19 PROCEDURE — 85025 COMPLETE CBC W/AUTO DIFF WBC: CPT

## 2025-08-19 PROCEDURE — 99284 EMERGENCY DEPT VISIT MOD MDM: CPT

## 2025-08-19 PROCEDURE — 93010 ELECTROCARDIOGRAM REPORT: CPT

## 2025-08-19 PROCEDURE — 99285 EMERGENCY DEPT VISIT HI MDM: CPT | Mod: 25

## 2025-08-19 PROCEDURE — 71046 X-RAY EXAM CHEST 2 VIEWS: CPT | Mod: 26

## 2025-08-19 PROCEDURE — 36415 COLL VENOUS BLD VENIPUNCTURE: CPT

## 2025-08-19 PROCEDURE — 80053 COMPREHEN METABOLIC PANEL: CPT

## 2025-08-19 PROCEDURE — 83735 ASSAY OF MAGNESIUM: CPT

## 2025-08-19 PROCEDURE — 71046 X-RAY EXAM CHEST 2 VIEWS: CPT

## 2025-08-19 PROCEDURE — 93005 ELECTROCARDIOGRAM TRACING: CPT

## 2025-09-10 ENCOUNTER — APPOINTMENT (OUTPATIENT)
Dept: CT IMAGING | Facility: CLINIC | Age: 89
End: 2025-09-10
Payer: MEDICARE

## 2025-09-10 PROCEDURE — 74174 CTA ABD&PLVS W/CONTRAST: CPT | Mod: 26

## 2025-09-20 ENCOUNTER — EMERGENCY (EMERGENCY)
Facility: HOSPITAL | Age: 89
LOS: 0 days | Discharge: HOME | End: 2025-09-20
Attending: EMERGENCY MEDICINE
Payer: MEDICARE

## 2025-09-20 VITALS
SYSTOLIC BLOOD PRESSURE: 114 MMHG | HEART RATE: 78 BPM | DIASTOLIC BLOOD PRESSURE: 77 MMHG | OXYGEN SATURATION: 100 % | RESPIRATION RATE: 18 BRPM | TEMPERATURE: 98 F

## 2025-09-20 VITALS
OXYGEN SATURATION: 99 % | TEMPERATURE: 97 F | RESPIRATION RATE: 18 BRPM | DIASTOLIC BLOOD PRESSURE: 69 MMHG | HEART RATE: 75 BPM | SYSTOLIC BLOOD PRESSURE: 107 MMHG
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
## 2025-09-20 DIAGNOSIS — S00.91XA ABRASION OF UNSPECIFIED PART OF HEAD, INITIAL ENCOUNTER: ICD-10-CM
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
## 2025-09-20 DIAGNOSIS — S09.90XA UNSPECIFIED INJURY OF HEAD, INITIAL ENCOUNTER: ICD-10-CM

## 2025-09-20 PROCEDURE — 72125 CT NECK SPINE W/O DYE: CPT | Mod: 26

## 2025-09-20 PROCEDURE — 70450 CT HEAD/BRAIN W/O DYE: CPT | Mod: 26

## 2025-09-20 PROCEDURE — 99284 EMERGENCY DEPT VISIT MOD MDM: CPT

## 2025-09-20 PROCEDURE — 70450 CT HEAD/BRAIN W/O DYE: CPT

## 2025-09-20 PROCEDURE — 72125 CT NECK SPINE W/O DYE: CPT

## 2025-09-20 PROCEDURE — 99284 EMERGENCY DEPT VISIT MOD MDM: CPT | Mod: 25
